# Patient Record
Sex: MALE | Race: WHITE | Employment: FULL TIME | URBAN - NONMETROPOLITAN AREA
[De-identification: names, ages, dates, MRNs, and addresses within clinical notes are randomized per-mention and may not be internally consistent; named-entity substitution may affect disease eponyms.]

---

## 2017-01-27 ENCOUNTER — OFFICE VISIT (OUTPATIENT)
Dept: BARIATRICS/WEIGHT MGMT | Age: 37
End: 2017-01-27

## 2017-01-27 VITALS
HEIGHT: 69 IN | BODY MASS INDEX: 41.59 KG/M2 | DIASTOLIC BLOOD PRESSURE: 74 MMHG | HEART RATE: 80 BPM | RESPIRATION RATE: 18 BRPM | SYSTOLIC BLOOD PRESSURE: 136 MMHG | TEMPERATURE: 98.6 F | WEIGHT: 280.8 LBS

## 2017-01-27 DIAGNOSIS — K21.9 GASTROESOPHAGEAL REFLUX DISEASE, ESOPHAGITIS PRESENCE NOT SPECIFIED: ICD-10-CM

## 2017-01-27 DIAGNOSIS — I10 HYPERTENSION, ESSENTIAL, BENIGN: ICD-10-CM

## 2017-01-27 DIAGNOSIS — N18.1 CKD (CHRONIC KIDNEY DISEASE) STAGE 1, GFR 90 ML/MIN OR GREATER: ICD-10-CM

## 2017-01-27 DIAGNOSIS — Q60.0 CONGENITAL ABSENCE OF ONE KIDNEY: ICD-10-CM

## 2017-01-27 DIAGNOSIS — E66.01 MORBID OBESITY WITH BODY MASS INDEX (BMI) OF 40.0 TO 44.9 IN ADULT (HCC): Primary | ICD-10-CM

## 2017-01-27 PROCEDURE — G8427 DOCREV CUR MEDS BY ELIG CLIN: HCPCS | Performed by: PHYSICIAN ASSISTANT

## 2017-01-27 PROCEDURE — 1036F TOBACCO NON-USER: CPT | Performed by: PHYSICIAN ASSISTANT

## 2017-01-27 PROCEDURE — G8484 FLU IMMUNIZE NO ADMIN: HCPCS | Performed by: PHYSICIAN ASSISTANT

## 2017-01-27 PROCEDURE — 99213 OFFICE O/P EST LOW 20 MIN: CPT | Performed by: PHYSICIAN ASSISTANT

## 2017-01-27 PROCEDURE — G8419 CALC BMI OUT NRM PARAM NOF/U: HCPCS | Performed by: PHYSICIAN ASSISTANT

## 2017-02-27 ENCOUNTER — OFFICE VISIT (OUTPATIENT)
Dept: BARIATRICS/WEIGHT MGMT | Age: 37
End: 2017-02-27

## 2017-02-27 VITALS
BODY MASS INDEX: 42.39 KG/M2 | SYSTOLIC BLOOD PRESSURE: 138 MMHG | DIASTOLIC BLOOD PRESSURE: 84 MMHG | RESPIRATION RATE: 18 BRPM | WEIGHT: 286.2 LBS | HEIGHT: 69 IN | HEART RATE: 88 BPM | TEMPERATURE: 98.8 F

## 2017-02-27 DIAGNOSIS — K21.9 GASTROESOPHAGEAL REFLUX DISEASE, ESOPHAGITIS PRESENCE NOT SPECIFIED: ICD-10-CM

## 2017-02-27 DIAGNOSIS — I10 ESSENTIAL HYPERTENSION: ICD-10-CM

## 2017-02-27 DIAGNOSIS — E66.01 MORBID OBESITY DUE TO EXCESS CALORIES (HCC): Primary | ICD-10-CM

## 2017-02-27 DIAGNOSIS — E66.9 OBESITY, UNSPECIFIED OBESITY SEVERITY, UNSPECIFIED OBESITY TYPE: Primary | ICD-10-CM

## 2017-02-27 DIAGNOSIS — N18.9 CHRONIC KIDNEY DISEASE, UNSPECIFIED STAGE: ICD-10-CM

## 2017-02-27 PROCEDURE — 99213 OFFICE O/P EST LOW 20 MIN: CPT | Performed by: SURGERY

## 2017-02-27 PROCEDURE — G8484 FLU IMMUNIZE NO ADMIN: HCPCS | Performed by: SURGERY

## 2017-02-27 PROCEDURE — G8427 DOCREV CUR MEDS BY ELIG CLIN: HCPCS | Performed by: SURGERY

## 2017-02-27 PROCEDURE — G8417 CALC BMI ABV UP PARAM F/U: HCPCS | Performed by: SURGERY

## 2017-02-27 PROCEDURE — 1036F TOBACCO NON-USER: CPT | Performed by: SURGERY

## 2017-02-27 ASSESSMENT — ENCOUNTER SYMPTOMS
EYES NEGATIVE: 1
APNEA: 0
ALLERGIC/IMMUNOLOGIC NEGATIVE: 1
GASTROINTESTINAL NEGATIVE: 1
SHORTNESS OF BREATH: 1

## 2017-03-08 ENCOUNTER — TELEPHONE (OUTPATIENT)
Dept: BARIATRICS/WEIGHT MGMT | Age: 37
End: 2017-03-08

## 2017-03-08 DIAGNOSIS — E55.9 VITAMIN D DEFICIENCY: Primary | ICD-10-CM

## 2017-03-08 RX ORDER — CHOLECALCIFEROL (VITAMIN D3) 1250 MCG
1 CAPSULE ORAL WEEKLY
Qty: 4 CAPSULE | Refills: 1 | Status: SHIPPED | OUTPATIENT
Start: 2017-03-08 | End: 2017-05-25

## 2017-03-10 ENCOUNTER — TELEPHONE (OUTPATIENT)
Dept: BARIATRICS/WEIGHT MGMT | Age: 37
End: 2017-03-10

## 2017-03-20 ENCOUNTER — OFFICE VISIT (OUTPATIENT)
Dept: BARIATRICS/WEIGHT MGMT | Age: 37
End: 2017-03-20

## 2017-03-20 VITALS
TEMPERATURE: 97.9 F | DIASTOLIC BLOOD PRESSURE: 72 MMHG | BODY MASS INDEX: 40.85 KG/M2 | HEART RATE: 64 BPM | SYSTOLIC BLOOD PRESSURE: 126 MMHG | RESPIRATION RATE: 18 BRPM | WEIGHT: 275.8 LBS | HEIGHT: 69 IN

## 2017-03-20 DIAGNOSIS — E66.9 OBESITY, UNSPECIFIED OBESITY SEVERITY, UNSPECIFIED OBESITY TYPE: Primary | ICD-10-CM

## 2017-03-20 DIAGNOSIS — E55.9 VITAMIN D DEFICIENCY: ICD-10-CM

## 2017-03-20 DIAGNOSIS — E66.01 MORBID OBESITY WITH BMI OF 40.0-44.9, ADULT (HCC): Primary | ICD-10-CM

## 2017-03-20 PROCEDURE — 1036F TOBACCO NON-USER: CPT | Performed by: PHYSICIAN ASSISTANT

## 2017-03-20 PROCEDURE — G8417 CALC BMI ABV UP PARAM F/U: HCPCS | Performed by: PHYSICIAN ASSISTANT

## 2017-03-20 PROCEDURE — 99213 OFFICE O/P EST LOW 20 MIN: CPT | Performed by: PHYSICIAN ASSISTANT

## 2017-03-20 PROCEDURE — G8427 DOCREV CUR MEDS BY ELIG CLIN: HCPCS | Performed by: PHYSICIAN ASSISTANT

## 2017-03-20 PROCEDURE — G8484 FLU IMMUNIZE NO ADMIN: HCPCS | Performed by: PHYSICIAN ASSISTANT

## 2017-04-03 ENCOUNTER — TELEPHONE (OUTPATIENT)
Age: 37
End: 2017-04-03

## 2017-04-03 DIAGNOSIS — E66.01 MORBID OBESITY WITH BMI OF 40.0-44.9, ADULT (HCC): Primary | ICD-10-CM

## 2017-04-24 ENCOUNTER — OFFICE VISIT (OUTPATIENT)
Dept: BARIATRICS/WEIGHT MGMT | Age: 37
End: 2017-04-24

## 2017-04-24 VITALS
WEIGHT: 272.8 LBS | RESPIRATION RATE: 16 BRPM | HEIGHT: 69 IN | TEMPERATURE: 98.2 F | BODY MASS INDEX: 40.4 KG/M2 | DIASTOLIC BLOOD PRESSURE: 78 MMHG | HEART RATE: 76 BPM | SYSTOLIC BLOOD PRESSURE: 132 MMHG

## 2017-04-24 DIAGNOSIS — E66.9 OBESITY, UNSPECIFIED OBESITY SEVERITY, UNSPECIFIED OBESITY TYPE: Primary | ICD-10-CM

## 2017-04-24 DIAGNOSIS — I10 HYPERTENSION, ESSENTIAL, BENIGN: Primary | ICD-10-CM

## 2017-04-24 DIAGNOSIS — Z01.812 PRE-PROCEDURE LAB EXAM: ICD-10-CM

## 2017-04-24 DIAGNOSIS — E66.01 OBESITY, MORBID, BMI 40.0-49.9 (HCC): ICD-10-CM

## 2017-04-24 DIAGNOSIS — K21.9 GASTROESOPHAGEAL REFLUX DISEASE, ESOPHAGITIS PRESENCE NOT SPECIFIED: ICD-10-CM

## 2017-04-24 PROCEDURE — G8427 DOCREV CUR MEDS BY ELIG CLIN: HCPCS | Performed by: SURGERY

## 2017-04-24 PROCEDURE — 1036F TOBACCO NON-USER: CPT | Performed by: SURGERY

## 2017-04-24 PROCEDURE — G8417 CALC BMI ABV UP PARAM F/U: HCPCS | Performed by: SURGERY

## 2017-04-24 PROCEDURE — 99213 OFFICE O/P EST LOW 20 MIN: CPT | Performed by: SURGERY

## 2017-04-24 ASSESSMENT — ENCOUNTER SYMPTOMS
RESPIRATORY NEGATIVE: 1
WHEEZING: 0
APNEA: 0
SORE THROAT: 0
COUGH: 0
ABDOMINAL PAIN: 0
COLOR CHANGE: 0
EYES NEGATIVE: 1
NAUSEA: 0
BLOOD IN STOOL: 0
RECTAL PAIN: 0
EYE PAIN: 0
SHORTNESS OF BREATH: 0
ABDOMINAL DISTENTION: 0
TROUBLE SWALLOWING: 0
STRIDOR: 0
CHOKING: 0
EYE DISCHARGE: 0
VOICE CHANGE: 0
PHOTOPHOBIA: 0
CONSTIPATION: 0
DIARRHEA: 0
BACK PAIN: 0
CHEST TIGHTNESS: 0
GASTROINTESTINAL NEGATIVE: 1
SINUS PRESSURE: 0
FACIAL SWELLING: 0
VOMITING: 0
EYE ITCHING: 0
EYE REDNESS: 0
RHINORRHEA: 0
ANAL BLEEDING: 0

## 2017-04-26 DIAGNOSIS — Z01.818 PRE-OP TESTING: Primary | ICD-10-CM

## 2017-04-26 DIAGNOSIS — R94.31 ABNORMAL EKG: ICD-10-CM

## 2017-05-25 ENCOUNTER — OFFICE VISIT (OUTPATIENT)
Dept: CARDIOLOGY | Age: 37
End: 2017-05-25

## 2017-05-25 VITALS
BODY MASS INDEX: 40.36 KG/M2 | HEART RATE: 68 BPM | SYSTOLIC BLOOD PRESSURE: 130 MMHG | HEIGHT: 69 IN | WEIGHT: 272.5 LBS | DIASTOLIC BLOOD PRESSURE: 70 MMHG

## 2017-05-25 DIAGNOSIS — E66.9 OBESITY, UNSPECIFIED OBESITY SEVERITY, UNSPECIFIED OBESITY TYPE: ICD-10-CM

## 2017-05-25 DIAGNOSIS — Z01.818 PRE-OPERATIVE CLEARANCE: Primary | ICD-10-CM

## 2017-05-25 DIAGNOSIS — I10 ESSENTIAL HYPERTENSION: ICD-10-CM

## 2017-05-25 DIAGNOSIS — Z87.891 EX-SMOKER: ICD-10-CM

## 2017-05-25 DIAGNOSIS — Z91.89 CARDIOVASCULAR RISK FACTOR: ICD-10-CM

## 2017-05-25 PROCEDURE — G8417 CALC BMI ABV UP PARAM F/U: HCPCS | Performed by: INTERNAL MEDICINE

## 2017-05-25 PROCEDURE — 99213 OFFICE O/P EST LOW 20 MIN: CPT | Performed by: INTERNAL MEDICINE

## 2017-05-25 PROCEDURE — 1036F TOBACCO NON-USER: CPT | Performed by: INTERNAL MEDICINE

## 2017-05-25 PROCEDURE — G8427 DOCREV CUR MEDS BY ELIG CLIN: HCPCS | Performed by: INTERNAL MEDICINE

## 2017-06-08 ENCOUNTER — TELEPHONE (OUTPATIENT)
Dept: BARIATRICS/WEIGHT MGMT | Age: 37
End: 2017-06-08

## 2017-06-12 ENCOUNTER — OFFICE VISIT (OUTPATIENT)
Dept: BARIATRICS/WEIGHT MGMT | Age: 37
End: 2017-06-12

## 2017-06-12 DIAGNOSIS — E66.9 OBESITY, UNSPECIFIED OBESITY SEVERITY, UNSPECIFIED OBESITY TYPE: Primary | ICD-10-CM

## 2017-06-23 ENCOUNTER — OFFICE VISIT (OUTPATIENT)
Dept: BARIATRICS/WEIGHT MGMT | Age: 37
End: 2017-06-23

## 2017-06-23 VITALS
DIASTOLIC BLOOD PRESSURE: 68 MMHG | WEIGHT: 260.6 LBS | HEART RATE: 80 BPM | TEMPERATURE: 98.6 F | HEIGHT: 69 IN | BODY MASS INDEX: 38.6 KG/M2 | SYSTOLIC BLOOD PRESSURE: 130 MMHG | RESPIRATION RATE: 18 BRPM

## 2017-06-23 DIAGNOSIS — K76.0 FATTY LIVER DISEASE, NONALCOHOLIC: ICD-10-CM

## 2017-06-23 DIAGNOSIS — E66.9 OBESITY, CLASS II, BMI 35-39.9: Primary | ICD-10-CM

## 2017-06-23 DIAGNOSIS — K21.9 GASTROESOPHAGEAL REFLUX DISEASE, ESOPHAGITIS PRESENCE NOT SPECIFIED: ICD-10-CM

## 2017-06-23 DIAGNOSIS — I10 HYPERTENSION, ESSENTIAL, BENIGN: ICD-10-CM

## 2017-06-23 PROCEDURE — 99213 OFFICE O/P EST LOW 20 MIN: CPT | Performed by: SURGERY

## 2017-06-23 PROCEDURE — G8417 CALC BMI ABV UP PARAM F/U: HCPCS | Performed by: SURGERY

## 2017-06-23 PROCEDURE — 1036F TOBACCO NON-USER: CPT | Performed by: SURGERY

## 2017-06-23 PROCEDURE — G8427 DOCREV CUR MEDS BY ELIG CLIN: HCPCS | Performed by: SURGERY

## 2017-06-23 ASSESSMENT — ENCOUNTER SYMPTOMS
EYES NEGATIVE: 1
GASTROINTESTINAL NEGATIVE: 1
RESPIRATORY NEGATIVE: 1

## 2017-06-24 RX ORDER — METOCLOPRAMIDE 10 MG/1
10 TABLET ORAL EVERY 6 HOURS PRN
Qty: 30 TABLET | Refills: 1 | Status: SHIPPED | OUTPATIENT
Start: 2017-06-26 | End: 2017-07-10

## 2017-06-24 RX ORDER — ONDANSETRON 4 MG/1
4 TABLET, FILM COATED ORAL EVERY 4 HOURS PRN
Qty: 30 TABLET | Refills: 1 | Status: SHIPPED | OUTPATIENT
Start: 2017-06-26 | End: 2017-07-10

## 2017-06-28 DIAGNOSIS — E66.9 OBESITY, CLASS II, BMI 35-39.9: ICD-10-CM

## 2017-06-28 DIAGNOSIS — Z98.84 S/P LAPAROSCOPIC SLEEVE GASTRECTOMY: Primary | ICD-10-CM

## 2017-06-28 DIAGNOSIS — K21.9 GASTROESOPHAGEAL REFLUX DISEASE, ESOPHAGITIS PRESENCE NOT SPECIFIED: ICD-10-CM

## 2017-06-28 RX ORDER — OMEPRAZOLE 40 MG/1
40 CAPSULE, DELAYED RELEASE ORAL DAILY
Qty: 30 CAPSULE | Refills: 2 | Status: SHIPPED | OUTPATIENT
Start: 2017-06-28 | End: 2018-02-12 | Stop reason: SDUPTHER

## 2017-07-03 ENCOUNTER — OFFICE VISIT (OUTPATIENT)
Dept: BARIATRICS/WEIGHT MGMT | Age: 37
End: 2017-07-03

## 2017-07-03 VITALS
RESPIRATION RATE: 18 BRPM | HEIGHT: 69 IN | WEIGHT: 251.2 LBS | BODY MASS INDEX: 37.2 KG/M2 | HEART RATE: 59 BPM | SYSTOLIC BLOOD PRESSURE: 123 MMHG | DIASTOLIC BLOOD PRESSURE: 74 MMHG | TEMPERATURE: 99.3 F

## 2017-07-03 DIAGNOSIS — E66.9 OBESITY (BMI 30-39.9): Primary | ICD-10-CM

## 2017-07-03 DIAGNOSIS — Z98.84 S/P LAPAROSCOPIC SLEEVE GASTRECTOMY: ICD-10-CM

## 2017-07-03 PROCEDURE — 99024 POSTOP FOLLOW-UP VISIT: CPT | Performed by: SURGERY

## 2017-07-03 RX ORDER — OXYCODONE HYDROCHLORIDE 5 MG/1
5-10 TABLET ORAL EVERY 4 HOURS PRN
Qty: 30 TABLET | Refills: 0 | Status: SHIPPED | OUTPATIENT
Start: 2017-07-03 | End: 2017-07-10

## 2017-07-03 RX ORDER — HYOSCYAMINE SULFATE 0.125 MG
125 TABLET,DISINTEGRATING ORAL EVERY 6 HOURS PRN
Qty: 30 TABLET | Refills: 0 | Status: SHIPPED | OUTPATIENT
Start: 2017-07-03 | End: 2017-07-10

## 2017-07-03 ASSESSMENT — ENCOUNTER SYMPTOMS
RESPIRATORY NEGATIVE: 1
EYES NEGATIVE: 1
ABDOMINAL DISTENTION: 0
ABDOMINAL PAIN: 1
NAUSEA: 0
BACK PAIN: 1
VOMITING: 0
CONSTIPATION: 1
BLOOD IN STOOL: 0
DIARRHEA: 0
RECTAL PAIN: 0

## 2017-07-10 ENCOUNTER — OFFICE VISIT (OUTPATIENT)
Dept: BARIATRICS/WEIGHT MGMT | Age: 37
End: 2017-07-10

## 2017-07-10 VITALS
HEIGHT: 69 IN | BODY MASS INDEX: 36.26 KG/M2 | SYSTOLIC BLOOD PRESSURE: 118 MMHG | DIASTOLIC BLOOD PRESSURE: 76 MMHG | TEMPERATURE: 98.5 F | RESPIRATION RATE: 18 BRPM | HEART RATE: 64 BPM | WEIGHT: 244.8 LBS

## 2017-07-10 DIAGNOSIS — E66.9 OBESITY (BMI 30-39.9): Primary | ICD-10-CM

## 2017-07-10 DIAGNOSIS — E66.9 OBESITY, CLASS II, BMI 35-39.9: ICD-10-CM

## 2017-07-10 DIAGNOSIS — Z13.21 SCREENING FOR MALNUTRITION: ICD-10-CM

## 2017-07-10 DIAGNOSIS — K91.2 POSTSURGICAL MALABSORPTION: ICD-10-CM

## 2017-07-10 DIAGNOSIS — Z98.84 STATUS POST LAPAROSCOPIC SLEEVE GASTRECTOMY: ICD-10-CM

## 2017-07-10 PROCEDURE — 99024 POSTOP FOLLOW-UP VISIT: CPT | Performed by: PHYSICIAN ASSISTANT

## 2017-07-10 RX ORDER — THIAMINE HCL 100 MG
2500 TABLET ORAL DAILY
COMMUNITY
End: 2017-09-18

## 2017-08-09 ENCOUNTER — OFFICE VISIT (OUTPATIENT)
Dept: BARIATRICS/WEIGHT MGMT | Age: 37
End: 2017-08-09

## 2017-08-09 VITALS
DIASTOLIC BLOOD PRESSURE: 70 MMHG | SYSTOLIC BLOOD PRESSURE: 108 MMHG | HEART RATE: 56 BPM | HEIGHT: 69 IN | BODY MASS INDEX: 34 KG/M2 | WEIGHT: 229.56 LBS | TEMPERATURE: 97.9 F

## 2017-08-09 DIAGNOSIS — Z13.21 MALNUTRITION SCREEN: ICD-10-CM

## 2017-08-09 DIAGNOSIS — E66.9 OBESITY, CLASS II, BMI 35-39.9: Primary | ICD-10-CM

## 2017-08-09 DIAGNOSIS — K91.2 POSTOPERATIVE INTESTINAL MALABSORPTION: ICD-10-CM

## 2017-08-09 DIAGNOSIS — Z98.84 STATUS POST LAPAROSCOPIC SLEEVE GASTRECTOMY: ICD-10-CM

## 2017-08-09 DIAGNOSIS — E66.01 MORBID OBESITY, UNSPECIFIED OBESITY TYPE (HCC): Primary | ICD-10-CM

## 2017-08-09 PROCEDURE — 99024 POSTOP FOLLOW-UP VISIT: CPT | Performed by: PHYSICIAN ASSISTANT

## 2017-09-13 ENCOUNTER — HOSPITAL ENCOUNTER (OUTPATIENT)
Age: 37
Discharge: HOME | End: 2017-09-13
Payer: COMMERCIAL

## 2017-09-13 DIAGNOSIS — E66.9 OBESITY, CLASS II, BMI 35-39.9: ICD-10-CM

## 2017-09-13 DIAGNOSIS — K91.2 POSTOPERATIVE INTESTINAL MALABSORPTION: ICD-10-CM

## 2017-09-13 DIAGNOSIS — Z98.84 STATUS POST LAPAROSCOPIC SLEEVE GASTRECTOMY: ICD-10-CM

## 2017-09-13 DIAGNOSIS — Z13.21 MALNUTRITION SCREEN: ICD-10-CM

## 2017-09-13 LAB
ALBUMIN SERPL-MCNC: 4.1 G/DL (ref 3.5–5.1)
ALP BLD-CCNC: 68 U/L (ref 38–126)
ALT SERPL-CCNC: 29 U/L (ref 11–66)
ANION GAP SERPL CALCULATED.3IONS-SCNC: 12 MEQ/L (ref 8–16)
AST SERPL-CCNC: 33 U/L (ref 5–40)
BASOPHILS # BLD: 0.7 %
BASOPHILS ABSOLUTE: 0 THOU/MM3 (ref 0–0.1)
BILIRUB SERPL-MCNC: 0.7 MG/DL (ref 0.3–1.2)
BUN BLDV-MCNC: 24 MG/DL (ref 7–22)
CALCIUM SERPL-MCNC: 9.9 MG/DL (ref 8.5–10.5)
CHLORIDE BLD-SCNC: 102 MEQ/L (ref 98–111)
CO2: 29 MEQ/L (ref 23–33)
CREAT SERPL-MCNC: 0.8 MG/DL (ref 0.4–1.2)
EOSINOPHIL # BLD: 1.8 %
EOSINOPHILS ABSOLUTE: 0.1 THOU/MM3 (ref 0–0.4)
GFR SERPL CREATININE-BSD FRML MDRD: > 90 ML/MIN/1.73M2
GLUCOSE BLD-MCNC: 79 MG/DL (ref 70–108)
HCT VFR BLD CALC: 42.2 % (ref 42–52)
HEMOGLOBIN: 14.3 GM/DL (ref 14–18)
LYMPHOCYTES # BLD: 36.6 %
LYMPHOCYTES ABSOLUTE: 2.2 THOU/MM3 (ref 1–4.8)
MCH RBC QN AUTO: 31.3 PG (ref 27–31)
MCHC RBC AUTO-ENTMCNC: 33.8 GM/DL (ref 33–37)
MCV RBC AUTO: 92.5 FL (ref 80–94)
MONOCYTES # BLD: 6.6 %
MONOCYTES ABSOLUTE: 0.4 THOU/MM3 (ref 0.4–1.3)
NUCLEATED RED BLOOD CELLS: 0 /100 WBC
PDW BLD-RTO: 13.2 % (ref 11.5–14.5)
PLATELET # BLD: 187 THOU/MM3 (ref 130–400)
PMV BLD AUTO: 9.9 MCM (ref 7.4–10.4)
POTASSIUM SERPL-SCNC: 4.5 MEQ/L (ref 3.5–5.2)
PREALBUMIN: 15.7 MG/DL (ref 20–40)
RBC # BLD: 4.56 MILL/MM3 (ref 4.7–6.1)
RBC # BLD: NORMAL 10*6/UL
SEG NEUTROPHILS: 54.3 %
SEGMENTED NEUTROPHILS ABSOLUTE COUNT: 3.2 THOU/MM3 (ref 1.8–7.7)
SODIUM BLD-SCNC: 143 MEQ/L (ref 135–145)
TOTAL PROTEIN: 7.2 G/DL (ref 6.1–8)
VITAMIN D 25-HYDROXY: 54 NG/ML (ref 30–100)
WBC # BLD: 5.9 THOU/MM3 (ref 4.8–10.8)

## 2017-09-13 PROCEDURE — 85025 COMPLETE CBC W/AUTO DIFF WBC: CPT

## 2017-09-13 PROCEDURE — 84134 ASSAY OF PREALBUMIN: CPT

## 2017-09-13 PROCEDURE — 82306 VITAMIN D 25 HYDROXY: CPT

## 2017-09-13 PROCEDURE — 80053 COMPREHEN METABOLIC PANEL: CPT

## 2017-09-13 PROCEDURE — 36415 COLL VENOUS BLD VENIPUNCTURE: CPT

## 2017-09-13 PROCEDURE — 84425 ASSAY OF VITAMIN B-1: CPT

## 2017-09-17 LAB — VITAMIN B1 WHOLE BLOOD: 168 NMOL/L (ref 70–180)

## 2017-09-18 ENCOUNTER — OFFICE VISIT (OUTPATIENT)
Dept: BARIATRICS/WEIGHT MGMT | Age: 37
End: 2017-09-18

## 2017-09-18 VITALS
WEIGHT: 210.4 LBS | DIASTOLIC BLOOD PRESSURE: 75 MMHG | TEMPERATURE: 98.6 F | BODY MASS INDEX: 31.16 KG/M2 | HEIGHT: 69 IN | HEART RATE: 52 BPM | RESPIRATION RATE: 16 BRPM | SYSTOLIC BLOOD PRESSURE: 120 MMHG

## 2017-09-18 DIAGNOSIS — K91.2 POSTOPERATIVE INTESTINAL MALABSORPTION: ICD-10-CM

## 2017-09-18 DIAGNOSIS — E66.01 MORBID OBESITY, UNSPECIFIED OBESITY TYPE (HCC): Primary | ICD-10-CM

## 2017-09-18 DIAGNOSIS — Z98.84 S/P LAPAROSCOPIC SLEEVE GASTRECTOMY: ICD-10-CM

## 2017-09-18 DIAGNOSIS — E66.9 OBESITY (BMI 30-39.9): Primary | ICD-10-CM

## 2017-09-18 DIAGNOSIS — Z13.21 MALNUTRITION SCREEN: ICD-10-CM

## 2017-09-18 PROCEDURE — 99024 POSTOP FOLLOW-UP VISIT: CPT | Performed by: PHYSICIAN ASSISTANT

## 2017-10-19 ENCOUNTER — HOSPITAL ENCOUNTER (OUTPATIENT)
Age: 37
Discharge: HOME | End: 2017-10-19
Payer: COMMERCIAL

## 2017-10-19 LAB
ANION GAP SERPL CALCULATED.3IONS-SCNC: 14 MEQ/L (ref 8–16)
BUN BLDV-MCNC: 26 MG/DL (ref 7–22)
CALCIUM SERPL-MCNC: 9.9 MG/DL (ref 8.5–10.5)
CHLORIDE BLD-SCNC: 100 MEQ/L (ref 98–111)
CO2: 29 MEQ/L (ref 23–33)
CREAT SERPL-MCNC: 0.7 MG/DL (ref 0.4–1.2)
GFR SERPL CREATININE-BSD FRML MDRD: > 90 ML/MIN/1.73M2
GLUCOSE BLD-MCNC: 80 MG/DL (ref 70–108)
POTASSIUM SERPL-SCNC: 5 MEQ/L (ref 3.5–5.2)
SODIUM BLD-SCNC: 143 MEQ/L (ref 135–145)

## 2017-10-19 PROCEDURE — 36415 COLL VENOUS BLD VENIPUNCTURE: CPT

## 2017-10-19 PROCEDURE — 80048 BASIC METABOLIC PNL TOTAL CA: CPT

## 2017-11-14 ENCOUNTER — OFFICE VISIT (OUTPATIENT)
Dept: NEPHROLOGY | Age: 37
End: 2017-11-14
Payer: COMMERCIAL

## 2017-11-14 VITALS
HEART RATE: 51 BPM | SYSTOLIC BLOOD PRESSURE: 124 MMHG | BODY MASS INDEX: 29.24 KG/M2 | DIASTOLIC BLOOD PRESSURE: 84 MMHG | OXYGEN SATURATION: 99 % | WEIGHT: 198 LBS

## 2017-11-14 DIAGNOSIS — Q60.0 UNILATERAL AGENESIS OF KIDNEY: Primary | ICD-10-CM

## 2017-11-14 DIAGNOSIS — I10 HYPERTENSION, ESSENTIAL, BENIGN: ICD-10-CM

## 2017-11-14 PROCEDURE — G8428 CUR MEDS NOT DOCUMENT: HCPCS | Performed by: INTERNAL MEDICINE

## 2017-11-14 PROCEDURE — G8417 CALC BMI ABV UP PARAM F/U: HCPCS | Performed by: INTERNAL MEDICINE

## 2017-11-14 PROCEDURE — 99213 OFFICE O/P EST LOW 20 MIN: CPT | Performed by: INTERNAL MEDICINE

## 2017-11-14 PROCEDURE — G8484 FLU IMMUNIZE NO ADMIN: HCPCS | Performed by: INTERNAL MEDICINE

## 2017-11-14 PROCEDURE — 1036F TOBACCO NON-USER: CPT | Performed by: INTERNAL MEDICINE

## 2017-11-14 NOTE — PROGRESS NOTES
[facility]          Outpatient Follow-Up note         11/14/2017 3:46 PM    Patient Name:   Silvino Brandon  YOB: 1980  Primary Care Physician:  Ramses Meade CNP     Chief Complaint / Reason for follow-up : Follow Up of  Solitary kidney and hypertension     Interval History :  Patient seen and examined by me. No distress  No chest pain or shortness of breath. Since I have last seen him patient has undergone a gastric sleeve surgery and has lost 90 pounds. He is currently not on any medications      Past History :  Past Medical History:   Diagnosis Date    Abnormal liver function tests     Congenital absence of one kidney     GERD (gastroesophageal reflux disease)     Hypertension     Hypertension, essential, benign      Past Surgical History:   Procedure Laterality Date    CARDIAC CATHETERIZATION  07/06/2016    CHOLECYSTECTOMY  07/11/2016    HIXENBAUGH    CYST REMOVAL Right 6 months old    testical-non cancerous    HERNIA REPAIR  6 months old    inguinal    LIVER BIOPSY  2012    WISDOM TOOTH EXTRACTION          Medications :     Outpatient Prescriptions Marked as Taking for the 11/14/17 encounter (Office Visit) with Jesus Jacob MD   Medication Sig Dispense Refill    omeprazole (PRILOSEC) 40 MG delayed release capsule Take 1 capsule by mouth daily Open capsule and take in liquid (Patient taking differently: Take 40 mg by mouth as needed Open capsule and take in liquid) 30 capsule 2    Calcium Citrate 250 MG TABS Take 500 mg by mouth 2 times daily       ketoconazole (NIZORAL) 2 % shampoo Apply topically daily as needed for Itching Apply topically daily as needed.  Multiple Vitamin (MVI, CELEBRATE, CHEWABLE TABLET) Take 3 tablets by mouth daily Takes 2 pills in the am and 1 pill in the pm.  This Multivitamin has extra iron in it.       LUCRECIA LO 40 40 % cream Apply topically nightly as needed       cetirizine (ZYRTEC ALLERGY) 10 MG tablet Take 1

## 2017-12-23 ENCOUNTER — HOSPITAL ENCOUNTER (OUTPATIENT)
Age: 37
Discharge: HOME | End: 2017-12-23
Payer: COMMERCIAL

## 2017-12-23 DIAGNOSIS — E66.9 OBESITY (BMI 30-39.9): ICD-10-CM

## 2017-12-23 DIAGNOSIS — K91.2 POSTOPERATIVE INTESTINAL MALABSORPTION: ICD-10-CM

## 2017-12-23 DIAGNOSIS — Z98.84 S/P LAPAROSCOPIC SLEEVE GASTRECTOMY: ICD-10-CM

## 2017-12-23 DIAGNOSIS — Z13.21 MALNUTRITION SCREEN: ICD-10-CM

## 2017-12-23 LAB
ALBUMIN SERPL-MCNC: 4.1 G/DL (ref 3.5–5.1)
ALP BLD-CCNC: 73 U/L (ref 38–126)
ALT SERPL-CCNC: 21 U/L (ref 11–66)
ANION GAP SERPL CALCULATED.3IONS-SCNC: 10 MEQ/L (ref 8–16)
AST SERPL-CCNC: 25 U/L (ref 5–40)
AVERAGE GLUCOSE: 87 MG/DL (ref 70–126)
BASOPHILS # BLD: 0.6 %
BASOPHILS ABSOLUTE: 0 THOU/MM3 (ref 0–0.1)
BILIRUB SERPL-MCNC: 0.9 MG/DL (ref 0.3–1.2)
BUN BLDV-MCNC: 23 MG/DL (ref 7–22)
CALCIUM SERPL-MCNC: 9.4 MG/DL (ref 8.5–10.5)
CHLORIDE BLD-SCNC: 103 MEQ/L (ref 98–111)
CO2: 30 MEQ/L (ref 23–33)
CREAT SERPL-MCNC: 0.7 MG/DL (ref 0.4–1.2)
EOSINOPHIL # BLD: 1.8 %
EOSINOPHILS ABSOLUTE: 0.1 THOU/MM3 (ref 0–0.4)
FERRITIN: 193 NG/ML (ref 22–322)
GFR SERPL CREATININE-BSD FRML MDRD: > 90 ML/MIN/1.73M2
GLUCOSE BLD-MCNC: 90 MG/DL (ref 70–108)
HBA1C MFR BLD: 4.9 % (ref 4.4–6.4)
HCT VFR BLD CALC: 42.9 % (ref 42–52)
HEMOGLOBIN: 14.5 GM/DL (ref 14–18)
IRON: 133 UG/DL (ref 65–195)
LYMPHOCYTES # BLD: 38.6 %
LYMPHOCYTES ABSOLUTE: 1.9 THOU/MM3 (ref 1–4.8)
MCH RBC QN AUTO: 31.7 PG (ref 27–31)
MCHC RBC AUTO-ENTMCNC: 33.9 GM/DL (ref 33–37)
MCV RBC AUTO: 93.7 FL (ref 80–94)
MONOCYTES # BLD: 6.9 %
MONOCYTES ABSOLUTE: 0.3 THOU/MM3 (ref 0.4–1.3)
NUCLEATED RED BLOOD CELLS: 0 /100 WBC
PDW BLD-RTO: 13.7 % (ref 11.5–14.5)
PLATELET # BLD: 196 THOU/MM3 (ref 130–400)
PMV BLD AUTO: 9.2 MCM (ref 7.4–10.4)
POTASSIUM SERPL-SCNC: 4.5 MEQ/L (ref 3.5–5.2)
PREALBUMIN: 16.8 MG/DL (ref 20–40)
PTH INTACT: 27.6 PG/ML (ref 15–65)
RBC # BLD: 4.58 MILL/MM3 (ref 4.7–6.1)
SEG NEUTROPHILS: 52.1 %
SEGMENTED NEUTROPHILS ABSOLUTE COUNT: 2.6 THOU/MM3 (ref 1.8–7.7)
SODIUM BLD-SCNC: 143 MEQ/L (ref 135–145)
TOTAL IRON BINDING CAPACITY: 243 UG/DL (ref 171–450)
TOTAL PROTEIN: 7.1 G/DL (ref 6.1–8)
TSH SERPL DL<=0.05 MIU/L-ACNC: 1.23 UIU/ML (ref 0.4–4.2)
VITAMIN D 25-HYDROXY: 51 NG/ML (ref 30–100)
WBC # BLD: 5 THOU/MM3 (ref 4.8–10.8)

## 2017-12-23 PROCEDURE — 83550 IRON BINDING TEST: CPT

## 2017-12-23 PROCEDURE — 83970 ASSAY OF PARATHORMONE: CPT

## 2017-12-23 PROCEDURE — 83540 ASSAY OF IRON: CPT

## 2017-12-23 PROCEDURE — 84134 ASSAY OF PREALBUMIN: CPT

## 2017-12-23 PROCEDURE — 36415 COLL VENOUS BLD VENIPUNCTURE: CPT

## 2017-12-23 PROCEDURE — 82306 VITAMIN D 25 HYDROXY: CPT

## 2017-12-23 PROCEDURE — 83036 HEMOGLOBIN GLYCOSYLATED A1C: CPT

## 2017-12-23 PROCEDURE — 80050 GENERAL HEALTH PANEL: CPT

## 2017-12-23 PROCEDURE — 84425 ASSAY OF VITAMIN B-1: CPT

## 2017-12-23 PROCEDURE — 82728 ASSAY OF FERRITIN: CPT

## 2017-12-28 LAB — VITAMIN B1 WHOLE BLOOD: 168 NMOL/L (ref 70–180)

## 2018-01-08 ENCOUNTER — OFFICE VISIT (OUTPATIENT)
Dept: BARIATRICS/WEIGHT MGMT | Age: 38
End: 2018-01-08
Payer: COMMERCIAL

## 2018-01-08 VITALS
BODY MASS INDEX: 27.85 KG/M2 | WEIGHT: 188 LBS | TEMPERATURE: 98.6 F | HEART RATE: 61 BPM | HEIGHT: 69 IN | SYSTOLIC BLOOD PRESSURE: 129 MMHG | DIASTOLIC BLOOD PRESSURE: 67 MMHG

## 2018-01-08 DIAGNOSIS — K21.9 GASTROESOPHAGEAL REFLUX DISEASE, ESOPHAGITIS PRESENCE NOT SPECIFIED: ICD-10-CM

## 2018-01-08 DIAGNOSIS — Z98.84 S/P LAPAROSCOPIC SLEEVE GASTRECTOMY: ICD-10-CM

## 2018-01-08 DIAGNOSIS — E66.3 OVERWEIGHT (BMI 25.0-29.9): Primary | ICD-10-CM

## 2018-01-08 PROCEDURE — G8417 CALC BMI ABV UP PARAM F/U: HCPCS | Performed by: PHYSICIAN ASSISTANT

## 2018-01-08 PROCEDURE — G8427 DOCREV CUR MEDS BY ELIG CLIN: HCPCS | Performed by: PHYSICIAN ASSISTANT

## 2018-01-08 PROCEDURE — 1036F TOBACCO NON-USER: CPT | Performed by: PHYSICIAN ASSISTANT

## 2018-01-08 PROCEDURE — 99213 OFFICE O/P EST LOW 20 MIN: CPT | Performed by: PHYSICIAN ASSISTANT

## 2018-01-08 PROCEDURE — G8484 FLU IMMUNIZE NO ADMIN: HCPCS | Performed by: PHYSICIAN ASSISTANT

## 2018-01-08 NOTE — PROGRESS NOTES
[facility] PROFESSIONAL SERVS  [facility]  [facility] [address] , [address]  [place] [address]  Dept: [phone]  Loc: [phone]      Visit Date:  1/8/2018  Weight Management Postop Follow-up    HPI:      Lesley Sweet is a 41 y.o. male who is here today for 6 months follow up since  robotic-assisted sleeve gastrectomy performed by Dr. Mine Murray  on 6/26/17. Doing great. Feeling great. Down 28# since last visit. Down 78# since surgery. BMI 27 . Has a personal goal of reaching 175#. Drinking over 64 oz of fluid and 80-90 grams of protein daily. Continues to drink 1-2 protein shakes daily. Tolerating post-op diet. No problems with bowel movements. No nausea. No emesis. No GERD/ Reflux-taking PPI every other day. Denies CP/SOB. No Dizziness. No abdominal pain. No incisional discomfort. No sx of dehydration. Taking and tolerating all vitamins. SECA scale and measurements completed and reviewed with patient today. Plan to focus on core strengthening. 6 month labs reviewed- prealbumin slightly low- reports fasting 16+ hours prior to lab draw. Eating/drinking at least 80 grams of protein daily. Physical Activity: Gym-treadmill/machines for 60 minutes  Days per week: 4-5    Current BMI: Body mass index is 27.76 kg/m².   Current Weight:   Wt Readings from Last 3 Encounters:   01/08/18 188 lb (85.3 kg)   11/14/17 198 lb (89.8 kg)   09/18/17 210 lb 6.4 oz (95.4 kg)     Pre-op Body Weight: 260  Initial: 278    Past Medical History:  Past Medical History:   Diagnosis Date    Abnormal liver function tests     Congenital absence of one kidney     GERD (gastroesophageal reflux disease)     Hypertension     Hypertension, essential, benign        Past Surgical History:  Past Surgical History:   Procedure Laterality Date    CARDIAC CATHETERIZATION  07/06/2016    CHOLECYST[facility]OMY  07/11/2016    [facility]    CYST REMOVAL Right 6 months old    testical-non cancerous    HERNIA REPAIR  6 months old    inguinal discomfort. (+) intermittent reflux  : Denies any hematuria, hesitancy or dysuria. NEURO: Denies seizures, headache. Objective:    /67 (Site: Right Arm, Position: Sitting, Cuff Size: Large Adult)   Pulse 61   Temp 98.6 °F (37 °C) (Oral)   Ht 5' 9\" (1.753 m)   Wt 188 lb (85.3 kg)   BMI 27.76 kg/m²     Physical Examination:   Constitutional: Alert and oriented to person, place and time. Well-developed, well- nourished. Head: Normocephalic and atraumatic  Neck: Supple. Eyes: EOMI b/l. Conjunctivae normal.  No scleral icterus. Respiratory: Effort normal. No respiratory distress. Abd: Benign  Ext:  Movement x 4. No edema  Skin; Warm and dry, no visible rashes, lesions or ulcers.    Neuro: Cranial Nerves Grossly Intact; nml coordination      Labs:  CBC   Lab Results   Component Value Date    WBC 5.0 12/23/2017    RBC 4.58 12/23/2017    HGB 14.5 12/23/2017    HCT 42.9 12/23/2017    MCV 93.7 12/23/2017    MCH 31.7 12/23/2017    MCHC 33.9 12/23/2017    RDW 13.7 12/23/2017     12/23/2017    MPV 9.2 12/23/2017    RBCMORP NORMAL 09/13/2017    SEGSPCT 52.1 12/23/2017    LABLYMP 38.6 12/23/2017    MONOPCT 6.9 12/23/2017    LABEOS 1.8 12/23/2017    BASO 0.6 12/23/2017    NRBC 0 12/23/2017    SEGSABS 2.6 12/23/2017    LYMPHSABS 1.9 12/23/2017    MONOSABS 0.3 12/23/2017    EOSABS 0.1 12/23/2017    BASOSABS 0.0 12/23/2017        BMP/CMP   Lab Results   Component Value Date    GLUCOSE 90 12/23/2017    CREATININE 0.7 12/23/2017    BUN 23 12/23/2017     12/23/2017    K 4.5 12/23/2017     12/23/2017    CO2 30 12/23/2017    CALCIUM 9.4 12/23/2017    AST 25 12/23/2017    ALKPHOS 73 12/23/2017    PROT 7.1 12/23/2017    LABALBU 4.1 12/23/2017    BILITOT 0.9 12/23/2017    ALT 21 12/23/2017        PREALBUMIN   Lab Results   Component Value Date    PREALBUMIN 16.8 12/23/2017        VITAMIN B12   Lab Results   Component Value Date    PEUPQUEC06 267 03/07/2017        24 HOUR URINE CALCIUM   No results found for: Devon Anshuconstanza NIKOUR     VITAMIN D   Lab Results   Component Value Date    XRTS78 67 12/23/2017        VITAMIN B1/ THIAMINE   Lab Results   Component Value Date    [id number] 168 12/23/2017        RBC FOLATE   Lab Results   Component Value Date    FOLATE 14.3 03/07/2017        LIPID SCREEN (FASTING)   Lab Results   Component Value Date    CHOL 157 03/07/2017    TRIG 65 03/07/2017    HDL 55 03/07/2017    LDLCALC 89 03/07/2017   ,     HGA1C (T2DM ONLY)   Lab Results   Component Value Date    LABA1C 4.9 12/23/2017    AVGG 87 12/23/2017        TSH   Lab Results   Component Value Date    TSH 1.230 12/23/2017        IRON   Lab Results   Component Value Date    IRON 133 12/23/2017        IONIZED CALCIUM   No results found for: LORE MCKEON      Assessment:      1. Overweight (BMI 25.0-29.9)     2. S/P laparoscopic sleeve gastrectomy     3. Gastroesophageal reflux disease, esophagitis presence not specified       Plan:    Stay well hydrated. Drink a minimum of 64 oz of non-carbonated, non-caffeinated fluids daily. Nutritional education occurred during visit. Tolerating diet. Continue following with dietitian and follow their recommendations as directed. Continue  At least 60 grams of protein each day. Signs and symptoms reviewed with patient that would be concerning and need her to return to office for re-evaluation. Patient will call if any questions or concerns arrise. Continue physical activity and strength training- focus on core strengthening  Continue taking Multivitamin, Calcium and B12 as directed  Encouraged to attend support groups  6 month labs reviewed with patient today  SECA scale completed and reviewed with patient  Measurements completed and reviewed. Return in about 3 months (around 4/8/2018) for postop follow up. I spent 15 minutes in direct patient care with greater than 50% spent in counseling and coordination of care.      Electronically signed by JOJO Herrera on 1/8/2018 at 3:52 PM

## 2018-02-06 ENCOUNTER — TELEPHONE (OUTPATIENT)
Dept: BARIATRICS/WEIGHT MGMT | Age: 38
End: 2018-02-06

## 2018-02-12 DIAGNOSIS — Z98.84 S/P LAPAROSCOPIC SLEEVE GASTRECTOMY: ICD-10-CM

## 2018-02-12 DIAGNOSIS — E66.9 OBESITY, CLASS II, BMI 35-39.9: ICD-10-CM

## 2018-02-12 DIAGNOSIS — K21.9 GASTROESOPHAGEAL REFLUX DISEASE, ESOPHAGITIS PRESENCE NOT SPECIFIED: ICD-10-CM

## 2018-02-12 RX ORDER — OMEPRAZOLE 40 MG/1
40 CAPSULE, DELAYED RELEASE ORAL DAILY
Qty: 30 CAPSULE | Refills: 2 | Status: SHIPPED | OUTPATIENT
Start: 2018-02-12 | End: 2018-06-18 | Stop reason: SDUPTHER

## 2018-02-15 ENCOUNTER — TELEPHONE (OUTPATIENT)
Dept: CARDIOLOGY CLINIC | Age: 38
End: 2018-02-15

## 2018-04-09 ENCOUNTER — OFFICE VISIT (OUTPATIENT)
Dept: BARIATRICS/WEIGHT MGMT | Age: 38
End: 2018-04-09
Payer: COMMERCIAL

## 2018-04-09 VITALS
BODY MASS INDEX: 27.13 KG/M2 | HEART RATE: 65 BPM | HEIGHT: 69 IN | SYSTOLIC BLOOD PRESSURE: 112 MMHG | RESPIRATION RATE: 16 BRPM | DIASTOLIC BLOOD PRESSURE: 76 MMHG | TEMPERATURE: 98.5 F | WEIGHT: 183.2 LBS

## 2018-04-09 DIAGNOSIS — K91.2 POSTOPERATIVE INTESTINAL MALABSORPTION: ICD-10-CM

## 2018-04-09 DIAGNOSIS — K21.9 GASTROESOPHAGEAL REFLUX DISEASE, ESOPHAGITIS PRESENCE NOT SPECIFIED: ICD-10-CM

## 2018-04-09 DIAGNOSIS — Z98.84 S/P LAPAROSCOPIC SLEEVE GASTRECTOMY: Primary | ICD-10-CM

## 2018-04-09 DIAGNOSIS — Z13.21 MALNUTRITION SCREEN: ICD-10-CM

## 2018-04-09 DIAGNOSIS — E66.3 OVERWEIGHT (BMI 25.0-29.9): ICD-10-CM

## 2018-04-09 PROCEDURE — G8427 DOCREV CUR MEDS BY ELIG CLIN: HCPCS | Performed by: PHYSICIAN ASSISTANT

## 2018-04-09 PROCEDURE — 99213 OFFICE O/P EST LOW 20 MIN: CPT | Performed by: PHYSICIAN ASSISTANT

## 2018-04-09 PROCEDURE — G8417 CALC BMI ABV UP PARAM F/U: HCPCS | Performed by: PHYSICIAN ASSISTANT

## 2018-04-09 PROCEDURE — 1036F TOBACCO NON-USER: CPT | Performed by: PHYSICIAN ASSISTANT

## 2018-06-14 ENCOUNTER — OFFICE VISIT (OUTPATIENT)
Dept: CARDIOLOGY CLINIC | Age: 38
End: 2018-06-14
Payer: COMMERCIAL

## 2018-06-14 VITALS
HEIGHT: 70 IN | DIASTOLIC BLOOD PRESSURE: 60 MMHG | WEIGHT: 191 LBS | HEART RATE: 56 BPM | SYSTOLIC BLOOD PRESSURE: 124 MMHG | BODY MASS INDEX: 27.35 KG/M2

## 2018-06-14 DIAGNOSIS — Z98.890 S/P CARDIAC CATH: Primary | ICD-10-CM

## 2018-06-14 PROCEDURE — G8417 CALC BMI ABV UP PARAM F/U: HCPCS | Performed by: INTERNAL MEDICINE

## 2018-06-14 PROCEDURE — 1036F TOBACCO NON-USER: CPT | Performed by: INTERNAL MEDICINE

## 2018-06-14 PROCEDURE — G8427 DOCREV CUR MEDS BY ELIG CLIN: HCPCS | Performed by: INTERNAL MEDICINE

## 2018-06-14 PROCEDURE — 93000 ELECTROCARDIOGRAM COMPLETE: CPT | Performed by: INTERNAL MEDICINE

## 2018-06-14 PROCEDURE — 99212 OFFICE O/P EST SF 10 MIN: CPT | Performed by: INTERNAL MEDICINE

## 2018-06-18 DIAGNOSIS — Z98.84 S/P LAPAROSCOPIC SLEEVE GASTRECTOMY: ICD-10-CM

## 2018-06-18 DIAGNOSIS — K21.9 GASTROESOPHAGEAL REFLUX DISEASE, ESOPHAGITIS PRESENCE NOT SPECIFIED: ICD-10-CM

## 2018-06-18 DIAGNOSIS — E66.9 OBESITY, CLASS II, BMI 35-39.9: ICD-10-CM

## 2018-06-18 RX ORDER — OMEPRAZOLE 40 MG/1
CAPSULE, DELAYED RELEASE ORAL
Qty: 30 CAPSULE | Refills: 2 | Status: SHIPPED | OUTPATIENT
Start: 2018-06-18 | End: 2018-12-10 | Stop reason: SDUPTHER

## 2018-06-26 ENCOUNTER — OFFICE VISIT (OUTPATIENT)
Dept: BARIATRICS/WEIGHT MGMT | Age: 38
End: 2018-06-26
Payer: COMMERCIAL

## 2018-06-26 ENCOUNTER — OFFICE VISIT (OUTPATIENT)
Dept: BARIATRICS/WEIGHT MGMT | Age: 38
End: 2018-06-26

## 2018-06-26 VITALS
TEMPERATURE: 98.2 F | HEIGHT: 69 IN | BODY MASS INDEX: 26.96 KG/M2 | SYSTOLIC BLOOD PRESSURE: 120 MMHG | DIASTOLIC BLOOD PRESSURE: 72 MMHG | WEIGHT: 182 LBS | HEART RATE: 62 BPM

## 2018-06-26 DIAGNOSIS — Z98.84 S/P LAPAROSCOPIC SLEEVE GASTRECTOMY: Primary | ICD-10-CM

## 2018-06-26 DIAGNOSIS — K21.9 GASTROESOPHAGEAL REFLUX DISEASE, ESOPHAGITIS PRESENCE NOT SPECIFIED: ICD-10-CM

## 2018-06-26 DIAGNOSIS — E66.3 OVERWEIGHT (BMI 25.0-29.9): ICD-10-CM

## 2018-06-26 PROCEDURE — G8417 CALC BMI ABV UP PARAM F/U: HCPCS | Performed by: PHYSICIAN ASSISTANT

## 2018-06-26 PROCEDURE — 99213 OFFICE O/P EST LOW 20 MIN: CPT | Performed by: PHYSICIAN ASSISTANT

## 2018-06-26 PROCEDURE — G8427 DOCREV CUR MEDS BY ELIG CLIN: HCPCS | Performed by: PHYSICIAN ASSISTANT

## 2018-06-26 PROCEDURE — 1036F TOBACCO NON-USER: CPT | Performed by: PHYSICIAN ASSISTANT

## 2018-12-10 ENCOUNTER — OFFICE VISIT (OUTPATIENT)
Dept: BARIATRICS/WEIGHT MGMT | Age: 38
End: 2018-12-10
Payer: COMMERCIAL

## 2018-12-10 VITALS
TEMPERATURE: 98.1 F | SYSTOLIC BLOOD PRESSURE: 120 MMHG | BODY MASS INDEX: 26.66 KG/M2 | HEART RATE: 58 BPM | WEIGHT: 180 LBS | DIASTOLIC BLOOD PRESSURE: 64 MMHG | HEIGHT: 69 IN

## 2018-12-10 DIAGNOSIS — Z98.84 STATUS POST LAPAROSCOPIC SLEEVE GASTRECTOMY: Primary | ICD-10-CM

## 2018-12-10 DIAGNOSIS — K91.2 POSTSURGICAL MALABSORPTION: ICD-10-CM

## 2018-12-10 DIAGNOSIS — Z13.21 SCREENING FOR MALNUTRITION: ICD-10-CM

## 2018-12-10 DIAGNOSIS — K21.9 GASTROESOPHAGEAL REFLUX DISEASE, ESOPHAGITIS PRESENCE NOT SPECIFIED: ICD-10-CM

## 2018-12-10 DIAGNOSIS — E66.3 OVERWEIGHT (BMI 25.0-29.9): ICD-10-CM

## 2018-12-10 PROCEDURE — G8427 DOCREV CUR MEDS BY ELIG CLIN: HCPCS | Performed by: PHYSICIAN ASSISTANT

## 2018-12-10 PROCEDURE — 99213 OFFICE O/P EST LOW 20 MIN: CPT | Performed by: PHYSICIAN ASSISTANT

## 2018-12-10 PROCEDURE — G8484 FLU IMMUNIZE NO ADMIN: HCPCS | Performed by: PHYSICIAN ASSISTANT

## 2018-12-10 PROCEDURE — G8417 CALC BMI ABV UP PARAM F/U: HCPCS | Performed by: PHYSICIAN ASSISTANT

## 2018-12-10 PROCEDURE — 1036F TOBACCO NON-USER: CPT | Performed by: PHYSICIAN ASSISTANT

## 2018-12-10 RX ORDER — OMEPRAZOLE 40 MG/1
CAPSULE, DELAYED RELEASE ORAL
Qty: 90 CAPSULE | Refills: 1 | Status: SHIPPED | OUTPATIENT
Start: 2018-12-10 | End: 2020-01-30

## 2018-12-10 NOTE — PROGRESS NOTES
[facility] PROFESSIONAL SERVS  \[facility]\""S [facility] CENTER  [address], [address]  [address] [address]  Dept: [phone]  Loc: [phone]      Visit Date:  12/10/2018  Weight Management Postop Follow-up    HPI:      Thai Loera is a 41 y.o. male who is here today for 18 months follow up since  robotic-assisted sleeve gastrectomy performed by Dr. Nadya Siddiqi  on 6/26/17. Doing well. Feeling great. Staying on track with proper nutrition. Down 2# since last visit. Down 80# since surgery. BMI 26 . Drinking  plenty of fluids. Getting in plenty of protein. Continues to drink protein shakes 1-2 daily. No carbonation. No sweets. Tolerating post-op diet. No problems with bowel movements. No nausea. No emesis. No GERD/ Reflux-continues to take PPI at times. Denies CP/SOB. No Dizziness. No abdominal pain. No incisional discomfort. No sx of dehydration. Taking and tolerating all vitamins. No questions or concerns today. Physical Activity: Active to work. No dedicated activity outside of work. No strength training-advised to start. Current BMI: Body mass index is 26.58 kg/m².   Current Weight:   Wt Readings from Last 3 Encounters:   12/10/18 180 lb (81.6 kg)   06/26/18 182 lb (82.6 kg)   06/14/18 191 lb (86.6 kg)     Pre-op Body Weight: 260  Initial: 278    Past Medical History:  Past Medical History:   Diagnosis Date    Abnormal liver function tests     Congenital absence of one kidney     GERD (gastroesophageal reflux disease)     Hypertension     Hypertension, essential, benign        Past Surgical History:  Past Surgical History:   Procedure Laterality Date    CARDIAC CATHETERIZATION  07/06/2016    CHOLECYST[address]OMY  07/11/2016    [address]    CYST REMOVAL Right 6 months old    testical-non cancerous    HERNIA REPAIR  6 months old    inguinal    LIVER BIOPSY  2012    STOMACH SURGERY      gastric sleeve    WISDOM TOOTH EXTRACTION         Past Social History:  Social History     Social History   

## 2019-01-07 ENCOUNTER — APPOINTMENT (OUTPATIENT)
Dept: GENERAL RADIOLOGY | Age: 39
End: 2019-01-07
Payer: COMMERCIAL

## 2019-01-07 ENCOUNTER — HOSPITAL ENCOUNTER (EMERGENCY)
Age: 39
Discharge: HOME | End: 2019-01-07
Payer: COMMERCIAL

## 2019-01-07 VITALS
TEMPERATURE: 100.3 F | OXYGEN SATURATION: 97 % | DIASTOLIC BLOOD PRESSURE: 59 MMHG | HEIGHT: 69 IN | WEIGHT: 180 LBS | SYSTOLIC BLOOD PRESSURE: 121 MMHG | RESPIRATION RATE: 17 BRPM | HEART RATE: 88 BPM | BODY MASS INDEX: 26.66 KG/M2

## 2019-01-07 DIAGNOSIS — J11.1 INFLUENZA WITH RESPIRATORY MANIFESTATION OTHER THAN PNEUMONIA: Primary | ICD-10-CM

## 2019-01-07 LAB
ALBUMIN SERPL-MCNC: 4.1 G/DL (ref 3.5–5.1)
ALP BLD-CCNC: 65 U/L (ref 38–126)
ALT SERPL-CCNC: 31 U/L (ref 11–66)
ANION GAP SERPL CALCULATED.3IONS-SCNC: 12 MEQ/L (ref 8–16)
AST SERPL-CCNC: 32 U/L (ref 5–40)
BASOPHILS # BLD: 0.3 %
BASOPHILS ABSOLUTE: 0 THOU/MM3 (ref 0–0.1)
BILIRUB SERPL-MCNC: 1 MG/DL (ref 0.3–1.2)
BUN BLDV-MCNC: 11 MG/DL (ref 7–22)
CALCIUM SERPL-MCNC: 8.7 MG/DL (ref 8.5–10.5)
CHLORIDE BLD-SCNC: 101 MEQ/L (ref 98–111)
CO2: 25 MEQ/L (ref 23–33)
CREAT SERPL-MCNC: 0.7 MG/DL (ref 0.4–1.2)
EOSINOPHIL # BLD: 0 %
EOSINOPHILS ABSOLUTE: 0 THOU/MM3 (ref 0–0.4)
ERYTHROCYTE [DISTWIDTH] IN BLOOD BY AUTOMATED COUNT: 11.6 % (ref 11.5–14.5)
ERYTHROCYTE [DISTWIDTH] IN BLOOD BY AUTOMATED COUNT: 40 FL (ref 35–45)
FLU A ANTIGEN: POSITIVE
FLU B ANTIGEN: NEGATIVE
GFR SERPL CREATININE-BSD FRML MDRD: > 90 ML/MIN/1.73M2
GLUCOSE BLD-MCNC: 115 MG/DL (ref 70–108)
GROUP A STREP CULTURE, REFLEX: NEGATIVE
HCT VFR BLD CALC: 42.5 % (ref 42–52)
HEMOGLOBIN: 14.3 GM/DL (ref 14–18)
IMMATURE GRANS (ABS): 0.01 THOU/MM3 (ref 0–0.07)
IMMATURE GRANULOCYTES: 0.2 %
LYMPHOCYTES # BLD: 23.4 %
LYMPHOCYTES ABSOLUTE: 1.5 THOU/MM3 (ref 1–4.8)
MCH RBC QN AUTO: 31.6 PG (ref 26–33)
MCHC RBC AUTO-ENTMCNC: 33.6 GM/DL (ref 32.2–35.5)
MCV RBC AUTO: 94 FL (ref 80–94)
MONOCYTES # BLD: 12.6 %
MONOCYTES ABSOLUTE: 0.8 THOU/MM3 (ref 0.4–1.3)
NUCLEATED RED BLOOD CELLS: 0 /100 WBC
OSMOLALITY CALCULATION: 276 MOSMOL/KG (ref 275–300)
PLATELET # BLD: 174 THOU/MM3 (ref 130–400)
PMV BLD AUTO: 10.2 FL (ref 9.4–12.4)
POTASSIUM SERPL-SCNC: 3.4 MEQ/L (ref 3.5–5.2)
RBC # BLD: 4.52 MILL/MM3 (ref 4.7–6.1)
REFLEX THROAT C + S: NORMAL
SEG NEUTROPHILS: 63.5 %
SEGMENTED NEUTROPHILS ABSOLUTE COUNT: 4.1 THOU/MM3 (ref 1.8–7.7)
SODIUM BLD-SCNC: 138 MEQ/L (ref 135–145)
TOTAL PROTEIN: 7.4 G/DL (ref 6.1–8)
WBC # BLD: 6.5 THOU/MM3 (ref 4.8–10.8)

## 2019-01-07 PROCEDURE — 80053 COMPREHEN METABOLIC PANEL: CPT

## 2019-01-07 PROCEDURE — 96374 THER/PROPH/DIAG INJ IV PUSH: CPT

## 2019-01-07 PROCEDURE — 2580000003 HC RX 258: Performed by: EMERGENCY MEDICINE

## 2019-01-07 PROCEDURE — 85025 COMPLETE CBC W/AUTO DIFF WBC: CPT

## 2019-01-07 PROCEDURE — 6360000002 HC RX W HCPCS: Performed by: EMERGENCY MEDICINE

## 2019-01-07 PROCEDURE — 87880 STREP A ASSAY W/OPTIC: CPT

## 2019-01-07 PROCEDURE — 87070 CULTURE OTHR SPECIMN AEROBIC: CPT

## 2019-01-07 PROCEDURE — 71045 X-RAY EXAM CHEST 1 VIEW: CPT

## 2019-01-07 PROCEDURE — 99283 EMERGENCY DEPT VISIT LOW MDM: CPT

## 2019-01-07 PROCEDURE — 36415 COLL VENOUS BLD VENIPUNCTURE: CPT

## 2019-01-07 PROCEDURE — 87804 INFLUENZA ASSAY W/OPTIC: CPT

## 2019-01-07 RX ORDER — KETOROLAC TROMETHAMINE 30 MG/ML
30 INJECTION, SOLUTION INTRAMUSCULAR; INTRAVENOUS ONCE
Status: COMPLETED | OUTPATIENT
Start: 2019-01-07 | End: 2019-01-07

## 2019-01-07 RX ORDER — OSELTAMIVIR PHOSPHATE 75 MG/1
75 CAPSULE ORAL 2 TIMES DAILY
Qty: 10 CAPSULE | Refills: 0 | Status: SHIPPED | OUTPATIENT
Start: 2019-01-07 | End: 2019-01-12

## 2019-01-07 RX ORDER — 0.9 % SODIUM CHLORIDE 0.9 %
1000 INTRAVENOUS SOLUTION INTRAVENOUS ONCE
Status: COMPLETED | OUTPATIENT
Start: 2019-01-07 | End: 2019-01-07

## 2019-01-07 RX ADMIN — KETOROLAC TROMETHAMINE 30 MG: 30 INJECTION, SOLUTION INTRAMUSCULAR at 09:23

## 2019-01-07 RX ADMIN — SODIUM CHLORIDE 1000 ML: 9 INJECTION, SOLUTION INTRAVENOUS at 09:24

## 2019-01-07 ASSESSMENT — ENCOUNTER SYMPTOMS
SHORTNESS OF BREATH: 0
EYE DISCHARGE: 0
COUGH: 1
WHEEZING: 0
BACK PAIN: 0
ABDOMINAL PAIN: 0
DIARRHEA: 0
RHINORRHEA: 0
NAUSEA: 1
EYE REDNESS: 0
SORE THROAT: 0
VOMITING: 0

## 2019-01-07 ASSESSMENT — PAIN SCALES - GENERAL: PAINLEVEL_OUTOF10: 7

## 2019-01-09 LAB — THROAT/NOSE CULTURE: NORMAL

## 2019-05-15 ENCOUNTER — HOSPITAL ENCOUNTER (OUTPATIENT)
Age: 39
Setting detail: SPECIMEN
Discharge: HOME | End: 2019-05-15
Payer: COMMERCIAL

## 2019-05-15 DIAGNOSIS — Z13.21 SCREENING FOR MALNUTRITION: ICD-10-CM

## 2019-05-15 DIAGNOSIS — K91.2 POSTSURGICAL MALABSORPTION: ICD-10-CM

## 2019-05-15 DIAGNOSIS — Z98.84 STATUS POST LAPAROSCOPIC SLEEVE GASTRECTOMY: ICD-10-CM

## 2019-05-15 DIAGNOSIS — E66.3 OVERWEIGHT (BMI 25.0-29.9): ICD-10-CM

## 2019-05-15 LAB
ABSOLUTE EOS #: 0.06 K/UL (ref 0–0.44)
ABSOLUTE IMMATURE GRANULOCYTE: <0.03 K/UL (ref 0–0.3)
ABSOLUTE LYMPH #: 2.62 K/UL (ref 1.1–3.7)
ABSOLUTE MONO #: 0.44 K/UL (ref 0.1–1.2)
ALBUMIN SERPL-MCNC: 4.7 G/DL (ref 3.5–5.2)
ALBUMIN/GLOBULIN RATIO: 1.8 (ref 1–2.5)
ALP BLD-CCNC: 53 U/L (ref 40–129)
ALT SERPL-CCNC: 20 U/L (ref 5–41)
ANION GAP SERPL CALCULATED.3IONS-SCNC: 14 MMOL/L (ref 9–17)
AST SERPL-CCNC: 24 U/L
BASOPHILS # BLD: 1 % (ref 0–2)
BASOPHILS ABSOLUTE: 0.04 K/UL (ref 0–0.2)
BILIRUB SERPL-MCNC: 0.78 MG/DL (ref 0.3–1.2)
BUN BLDV-MCNC: 20 MG/DL (ref 6–20)
BUN/CREAT BLD: NORMAL (ref 9–20)
CALCIUM SERPL-MCNC: 9.6 MG/DL (ref 8.6–10.4)
CHLORIDE BLD-SCNC: 101 MMOL/L (ref 98–107)
CHOLESTEROL/HDL RATIO: 1.9
CHOLESTEROL: 160 MG/DL
CO2: 28 MMOL/L (ref 20–31)
CREAT SERPL-MCNC: 0.78 MG/DL (ref 0.7–1.2)
DIFFERENTIAL TYPE: NORMAL
EOSINOPHILS RELATIVE PERCENT: 1 % (ref 1–4)
ESTIMATED AVERAGE GLUCOSE: 91 MG/DL
FERRITIN: 143 UG/L (ref 30–400)
FOLATE: 17.3 NG/ML
GFR AFRICAN AMERICAN: >60 ML/MIN
GFR NON-AFRICAN AMERICAN: >60 ML/MIN
GFR SERPL CREATININE-BSD FRML MDRD: NORMAL ML/MIN/{1.73_M2}
GFR SERPL CREATININE-BSD FRML MDRD: NORMAL ML/MIN/{1.73_M2}
GLUCOSE BLD-MCNC: 80 MG/DL (ref 70–99)
HBA1C MFR BLD: 4.8 % (ref 4–6)
HCT VFR BLD CALC: 44.4 % (ref 40.7–50.3)
HDLC SERPL-MCNC: 84 MG/DL
HEMOGLOBIN: 14.2 G/DL (ref 13–17)
IMMATURE GRANULOCYTES: 0 %
IRON: 71 UG/DL (ref 59–158)
LDL CHOLESTEROL: 67 MG/DL (ref 0–130)
LYMPHOCYTES # BLD: 40 % (ref 24–43)
MCH RBC QN AUTO: 31.6 PG (ref 25.2–33.5)
MCHC RBC AUTO-ENTMCNC: 32 G/DL (ref 28.4–34.8)
MCV RBC AUTO: 98.7 FL (ref 82.6–102.9)
MONOCYTES # BLD: 7 % (ref 3–12)
NRBC AUTOMATED: 0 PER 100 WBC
PDW BLD-RTO: 12.6 % (ref 11.8–14.4)
PLATELET # BLD: 239 K/UL (ref 138–453)
PLATELET ESTIMATE: NORMAL
PMV BLD AUTO: 10.4 FL (ref 8.1–13.5)
POTASSIUM SERPL-SCNC: 3.8 MMOL/L (ref 3.7–5.3)
PREALBUMIN: 25.8 MG/DL (ref 20–40)
PTH INTACT: 38.4 PG/ML (ref 15–65)
RBC # BLD: 4.5 M/UL (ref 4.21–5.77)
RBC # BLD: NORMAL 10*6/UL
SEG NEUTROPHILS: 51 % (ref 36–65)
SEGMENTED NEUTROPHILS ABSOLUTE COUNT: 3.4 K/UL (ref 1.5–8.1)
SODIUM BLD-SCNC: 143 MMOL/L (ref 135–144)
TOTAL PROTEIN: 7.3 G/DL (ref 6.4–8.3)
TRIGL SERPL-MCNC: 43 MG/DL
TSH SERPL DL<=0.05 MIU/L-ACNC: 2.59 MIU/L (ref 0.3–5)
VITAMIN B-12: 646 PG/ML (ref 232–1245)
VITAMIN D 25-HYDROXY: 74.1 NG/ML (ref 30–100)
VLDLC SERPL CALC-MCNC: NORMAL MG/DL (ref 1–30)
WBC # BLD: 6.6 K/UL (ref 3.5–11.3)
WBC # BLD: NORMAL 10*3/UL

## 2019-05-16 ENCOUNTER — HOSPITAL ENCOUNTER (OUTPATIENT)
Dept: WOMENS IMAGING | Age: 39
Discharge: HOME | End: 2019-05-16
Payer: COMMERCIAL

## 2019-05-16 DIAGNOSIS — Z98.84 STATUS POST LAPAROSCOPIC SLEEVE GASTRECTOMY: ICD-10-CM

## 2019-05-16 DIAGNOSIS — K91.2 POSTSURGICAL MALABSORPTION: ICD-10-CM

## 2019-05-16 DIAGNOSIS — E66.3 OVERWEIGHT (BMI 25.0-29.9): ICD-10-CM

## 2019-05-16 DIAGNOSIS — Z13.21 SCREENING FOR MALNUTRITION: ICD-10-CM

## 2019-05-16 PROCEDURE — 77080 DXA BONE DENSITY AXIAL: CPT

## 2019-05-22 LAB — VITAMIN B1 WHOLE BLOOD: 178 NMOL/L (ref 70–180)

## 2019-06-03 ENCOUNTER — OFFICE VISIT (OUTPATIENT)
Dept: BARIATRICS/WEIGHT MGMT | Age: 39
End: 2019-06-03
Payer: COMMERCIAL

## 2019-06-03 VITALS
BODY MASS INDEX: 27.11 KG/M2 | DIASTOLIC BLOOD PRESSURE: 76 MMHG | HEART RATE: 60 BPM | HEIGHT: 69 IN | WEIGHT: 183 LBS | TEMPERATURE: 98 F | SYSTOLIC BLOOD PRESSURE: 132 MMHG

## 2019-06-03 DIAGNOSIS — K21.9 GASTROESOPHAGEAL REFLUX DISEASE, ESOPHAGITIS PRESENCE NOT SPECIFIED: ICD-10-CM

## 2019-06-03 DIAGNOSIS — Z98.84 STATUS POST LAPAROSCOPIC SLEEVE GASTRECTOMY: Primary | ICD-10-CM

## 2019-06-03 DIAGNOSIS — E66.3 OVERWEIGHT (BMI 25.0-29.9): ICD-10-CM

## 2019-06-03 PROCEDURE — G8427 DOCREV CUR MEDS BY ELIG CLIN: HCPCS | Performed by: PHYSICIAN ASSISTANT

## 2019-06-03 PROCEDURE — 1036F TOBACCO NON-USER: CPT | Performed by: PHYSICIAN ASSISTANT

## 2019-06-03 PROCEDURE — G8417 CALC BMI ABV UP PARAM F/U: HCPCS | Performed by: PHYSICIAN ASSISTANT

## 2019-06-03 PROCEDURE — 99213 OFFICE O/P EST LOW 20 MIN: CPT | Performed by: PHYSICIAN ASSISTANT

## 2019-06-03 NOTE — PROGRESS NOTES
[facility]  [facility]  [address] , [address]  [address] [address]  Dept: [phone]  Loc: [phone]      Visit Date:  6/3/2019  Weight Management Postop Follow-up    HPI:      Shila Gorman is a 46 y.o. male who is here today for 2 year follow up since  robotic-assisted sleeve gastrectomy performed by Dr. Gold Tsai  on 6/26/17. Doing very well. Up 3# since last visit. Down 77# since surgery. BMI 27 . Drinking plenty of fluids. Getting in plenty of protein. Eating 2 protein bars daily. Drinking 1 beer most days of the week. No sweets. Tolerating post-op diet. No problems with bowel movements. No nausea. No emesis. No GERD/ Reflux-continues to take PPI 3 times weekly. Denies CP/SOB. No Dizziness. No abdominal pain. No incisional discomfort. No sx of dehydration. No fever or chills. Taking and tolerating all vitamins. 2 year labs reviewed. Seca scale completed and reviewed. Physical Activity: No dedicated physical activity outside of work. Encouraged strength training. Current BMI: Body mass index is 27.02 kg/m².   Current Weight:   Wt Readings from Last 3 Encounters:   06/03/19 183 lb (83 kg)   01/07/19 180 lb (81.6 kg)   12/10/18 180 lb (81.6 kg)     Pre-op Body Weight: 260  Initial: 278    Past Medical History:  Past Medical History:   Diagnosis Date    Abnormal liver function tests     Congenital absence of one kidney     GERD (gastroesophageal reflux disease)     Hypertension     Hypertension, essential, benign        Past Surgical History:  Past Surgical History:   Procedure Laterality Date    CARDIAC CATHETERIZATION  07/06/2016    CHOLECYSTECTOMY  07/11/2016    [address]    CYST REMOVAL Right 6 months old    testical-non cancerous    HERNIA REPAIR  6 months old    inguinal    LIVER BIOPSY  2012    STOMACH SURGERY      gastric sleeve    WISDOM TOOTH EXTRACTION         Past Social History:  Social History     Socioeconomic History    Marital status:      Spouse name: Not on file    Number of children: Not on file    Years of education: Not on file    Highest education level: Not on file   Occupational History    Not on file   Social Needs    Financial resource strain: Not on file    Food insecurity:     Worry: Not on file     Inability: Not on file    Transportation needs:     Medical: Not on file     Non-medical: Not on file   Tobacco Use    Smoking status: Former Smoker     Last attempt to quit: 1/1/2009     Years since quitting: 10.4    Smokeless tobacco: Former User     Quit date: 11/1/2012   Substance and Sexual Activity    Alcohol use: No     Alcohol/week: 0.0 oz    Drug use: No    Sexual activity: Not on file   Lifestyle    Physical activity:     Days per week: Not on file     Minutes per session: Not on file    Stress: Not on file   Relationships    Social connections:     Talks on phone: Not on file     Gets together: Not on file     Attends Worship service: Not on file     Active member of club or organization: Not on file     Attends meetings of clubs or organizations: Not on file     Relationship status: Not on file    Intimate partner violence:     Fear of current or ex partner: Not on file     Emotionally abused: Not on file     Physically abused: Not on file     Forced sexual activity: Not on file   Other Topics Concern    Not on file   Social History Narrative    Not on file        Medications:   Current Outpatient Medications   Medication Sig Dispense Refill    omeprazole (PRILOSEC) 40 MG delayed release capsule TAKE 1 CAPSULE BY MOUTH ONCE DAILY 90 capsule 1    Calcium Citrate 250 MG TABS Take 500 mg by mouth 2 times daily       Multiple Vitamin (MVI, CELEBRATE, CHEWABLE TABLET) Take 3 tablets by mouth daily Takes 2 pills in the am and 1 pill in the pm.  This Multivitamin has extra iron in it.       LUCRECIA LO 40 40 % cream Apply topically nightly as needed       cetirizine (ZYRTEC ALLERGY) 10 MG tablet Take 1 tablet by mouth daily 30 tablet 0    ketoconazole (NIZORAL) 2 % shampoo Apply topically daily as needed for Itching Apply topically daily as needed. No current facility-administered medications for this visit. Allergies: Allergies   Allergen Reactions    Ciprofloxacin Hives    Other Dermatitis     \"tide\" laundry soap       Subjective:    Constitutional: Denies any fever, chills, fatigue. Wound: Denies any rash, skin color changes or wound problems. Resp: Denies any cough, shortness of breath. CV: Denies any chest pain, orthopnea or syncope. MS: Denies myalgias, arthralgias. GI: Denies any nausea, vomiting, diarrhea, constipation, melena, hematochezia. No incisional discomfort. : Denies any hematuria, hesitancy or dysuria. NEURO: Denies seizures, headache. Objective:    /76 (Site: Left Upper Arm, Position: Sitting, Cuff Size: Large Adult)   Pulse 60   Temp 98 °F (36.7 °C) (Oral)   Ht 5' 9\" (1.753 m)   Wt 183 lb (83 kg)   BMI 27.02 kg/m²     Physical Examination:   Constitutional: Alert and oriented to person, place and time. Well-developed, well- nourished. Head: Normocephalic and atraumatic  Neck: Supple. Eyes: EOMI b/l. Conjunctivae normal.  No scleral icterus. Respiratory: Effort normal. No respiratory distress. Abd: Benign  Ext:  Movement x 4. No edema  Skin; Warm and dry, no visible rashes, lesions or ulcers.    Neuro: Cranial Nerves Grossly Intact; nml coordination      Labs:  CBC   Lab Results   Component Value Date    WBC 6.6 05/15/2019    RBC 4.50 05/15/2019    HGB 14.2 05/15/2019    HCT 44.4 05/15/2019    MCV 98.7 05/15/2019    MCH 31.6 05/15/2019    MCHC 32.0 05/15/2019    RDW 12.6 05/15/2019     05/15/2019    MPV 10.4 05/15/2019    RBCMORP NOT REPORTED 05/15/2019    SEGSPCT 63.5 01/07/2019    LABLYMP 23.4 01/07/2019    MONOPCT 7 05/15/2019    LABEOS 0.0 01/07/2019    BASO 0.3 01/07/2019    NRBC 0 01/07/2019    SEGSABS 4.1 01/07/2019    LYMPHSABS 2.62 05/15/2019    LYMPHSABS 1.5 01/07/2019    MONOSABS 0.44 05/15/2019    MONOSABS 0.8 01/07/2019    EOSABS 0.06 05/15/2019    EOSABS 0.0 01/07/2019    BASOSABS 0.04 05/15/2019        BMP/CMP   Lab Results   Component Value Date    GLUCOSE 80 05/15/2019    CREATININE 0.78 05/15/2019    BUN 20 05/15/2019     05/15/2019    K 3.8 05/15/2019     05/15/2019    CO2 28 05/15/2019    CALCIUM 9.6 05/15/2019    AST 24 05/15/2019    ALKPHOS 53 05/15/2019    PROT 7.3 05/15/2019    LABALBU 4.7 05/15/2019    BILITOT 0.78 05/15/2019    ALT 20 05/15/2019        PREALBUMIN   Lab Results   Component Value Date    PREALBUMIN 25.8 05/15/2019        VITAMIN B12   Lab Results   Component Value Date    [id number] 646 05/15/2019        24 HOUR URINE CALCIUM   No results found for: Ettie Divine, CALCIUMUR     VITAMIN D   Lab Results   Component Value Date    VITD25 74.1 05/15/2019        VITAMIN B1/ THIAMINE   Lab Results   Component Value Date    [id number] 178 05/15/2019        RBC FOLATE   Lab Results   Component Value Date    FOLATE 17.3 05/15/2019        LIPID SCREEN (FASTING)   Lab Results   Component Value Date    CHOL 160 05/15/2019    CHOL 157 03/07/2017    TRIG 43 05/15/2019    HDL 84 05/15/2019    LDLCALC 89 03/07/2017   ,     HGA1C (T2DM ONLY)   Lab Results   Component Value Date    LABA1C 4.8 05/15/2019    AVGG 87 12/23/2017        TSH   Lab Results   Component Value Date    TSH 2.59 05/15/2019        IRON   Lab Results   Component Value Date    IRON 71 05/15/2019        IONIZED CALCIUM   No results found for: LOER MCKEON      Assessment:       Diagnosis Orders   1. Status post laparoscopic sleeve gastrectomy     2. Overweight (BMI 25.0-29.9)     3. Gastroesophageal reflux disease, esophagitis presence not specified       Plan:    Stay well hydrated. Drink a minimum of 64 oz of non-carbonated, non-caffeinated fluids daily. Nutritional education occurred during visit. Tolerating diet.  Continue following with dietitian and follow their recommendations as directed. Continue  60-80 grams of protein each day. Signs and symptoms reviewed with patient that would be concerning and need her to return to office for re-evaluation. Patient will call if any questions or concerns arrise. Importance of physical activity discussed with patient. Increase physical activity as tolerated  Add strength training  Continue taking Multivitamin, Calcium and B12 as directed  Encouraged to attend support groups  2 year labs reviewed with patient today  SECA scale completed and reviewed with patient today  Measurements completed and reviewed with patient today  Return in about 6 months (around 12/3/2019) for postop follow up. I spent 15 minutes in direct patient care with greater than 50% spent in counseling and coordination of care.      Electronically signed by JOJO Rodriguez on 6/3/2019 at 3:38 PM

## 2020-01-30 RX ORDER — OMEPRAZOLE 40 MG/1
CAPSULE, DELAYED RELEASE ORAL
Qty: 30 CAPSULE | Refills: 0 | Status: SHIPPED | OUTPATIENT
Start: 2020-01-30 | End: 2020-01-31 | Stop reason: SDUPTHER

## 2020-01-31 RX ORDER — OMEPRAZOLE 40 MG/1
CAPSULE, DELAYED RELEASE ORAL
Qty: 30 CAPSULE | Refills: 0 | Status: SHIPPED | OUTPATIENT
Start: 2020-01-31 | End: 2021-02-18 | Stop reason: SDUPTHER

## 2020-02-21 ENCOUNTER — OFFICE VISIT (OUTPATIENT)
Dept: BARIATRICS/WEIGHT MGMT | Age: 40
End: 2020-02-21
Payer: COMMERCIAL

## 2020-02-21 VITALS
BODY MASS INDEX: 28.44 KG/M2 | RESPIRATION RATE: 18 BRPM | SYSTOLIC BLOOD PRESSURE: 126 MMHG | HEART RATE: 84 BPM | DIASTOLIC BLOOD PRESSURE: 78 MMHG | HEIGHT: 69 IN | WEIGHT: 192 LBS | TEMPERATURE: 98.2 F

## 2020-02-21 PROCEDURE — 1036F TOBACCO NON-USER: CPT | Performed by: SURGERY

## 2020-02-21 PROCEDURE — 99213 OFFICE O/P EST LOW 20 MIN: CPT | Performed by: SURGERY

## 2020-02-21 PROCEDURE — G8484 FLU IMMUNIZE NO ADMIN: HCPCS | Performed by: SURGERY

## 2020-02-21 PROCEDURE — G8427 DOCREV CUR MEDS BY ELIG CLIN: HCPCS | Performed by: SURGERY

## 2020-02-21 PROCEDURE — G8417 CALC BMI ABV UP PARAM F/U: HCPCS | Performed by: SURGERY

## 2020-02-22 ASSESSMENT — ENCOUNTER SYMPTOMS
BACK PAIN: 0
APNEA: 0
CHOKING: 0
SORE THROAT: 0
CHEST TIGHTNESS: 0
VOMITING: 0
EYE PAIN: 0
EYE DISCHARGE: 0
EYE ITCHING: 0
CONSTIPATION: 0
ABDOMINAL PAIN: 0
COLOR CHANGE: 0
PHOTOPHOBIA: 0
WHEEZING: 0
SINUS PRESSURE: 0
COUGH: 0
NAUSEA: 0
VOICE CHANGE: 0
FACIAL SWELLING: 0
ABDOMINAL DISTENTION: 0
TROUBLE SWALLOWING: 0
STRIDOR: 0
ALLERGIC/IMMUNOLOGIC NEGATIVE: 1
EYE REDNESS: 0
RHINORRHEA: 0
RECTAL PAIN: 0
DIARRHEA: 0
ANAL BLEEDING: 0
SHORTNESS OF BREATH: 0
BLOOD IN STOOL: 0

## 2020-02-22 NOTE — PROGRESS NOTES
Subjective:      Patient ID: Abebe Polanco is a 45 y.o. male. Chief Complaint   Patient presents with    Follow-up     2.5 year post op ; h&p 260lb, surgery 06/26/2017, current 192lb     HPI  Davi Son is a 78-year-old male who presents for 2-1/2-year follow-up status post sleeve gastrectomy. Weight prior to surgery was around 260 pounds. Current weight 192 pounds. He states he had been down to 178 pounds but he feels better at around 190 pounds. He has been staying well-hydrated. Avoiding carbohydrates/sugars. Maintaining protein intake. Taking multivitamin as directed. Denies any abdominal or chest pain. No shortness of breath. No urinary complaints. No hematochezia or melena. Denies significant nausea or vomiting. Admits he does have some GERD and used to take his PPI daily but now only takes antacid medication 1-2 times per week as needed. He states he feels he has been doing really well and really has no complaints or concerns. Review of Systems   Constitutional: Negative for activity change, appetite change, chills, diaphoresis, fatigue, fever and unexpected weight change. HENT: Negative for congestion, dental problem, drooling, ear discharge, ear pain, facial swelling, hearing loss, mouth sores, nosebleeds, postnasal drip, rhinorrhea, sinus pressure, sneezing, sore throat, tinnitus, trouble swallowing and voice change. Eyes: Negative for photophobia, pain, discharge, redness, itching and visual disturbance. Respiratory: Negative for apnea, cough, choking, chest tightness, shortness of breath, wheezing and stridor. Cardiovascular: Negative for chest pain, palpitations and leg swelling. Gastrointestinal: Negative for abdominal distention, abdominal pain, anal bleeding, blood in stool, constipation, diarrhea, nausea, rectal pain and vomiting. Endocrine: Negative.     Genitourinary: Negative for decreased urine volume, difficulty urinating, discharge, dysuria, enuresis, flank pain, frequency, genital sores, hematuria, penile pain, penile swelling, scrotal swelling, testicular pain and urgency. Musculoskeletal: Negative for arthralgias, back pain, gait problem, joint swelling, myalgias, neck pain and neck stiffness. Skin: Negative for color change, pallor, rash and wound. Allergic/Immunologic: Negative. Neurological: Negative for dizziness, tremors, seizures, syncope, facial asymmetry, speech difficulty, weakness, light-headedness, numbness and headaches. Hematological: Negative for adenopathy. Does not bruise/bleed easily. Psychiatric/Behavioral: Negative for agitation, behavioral problems, confusion, decreased concentration, dysphoric mood, hallucinations, self-injury, sleep disturbance and suicidal ideas. The patient is not nervous/anxious and is not hyperactive. Past Medical History:   Diagnosis Date    Abnormal liver function tests     Congenital absence of one kidney     GERD (gastroesophageal reflux disease)     Hypertension     Hypertension, essential, benign        Past Surgical History:   Procedure Laterality Date    CARDIAC CATHETERIZATION  07/06/2016    CHOLECYSTECTOMY  07/11/2016    HIXENBAUGH    CYST REMOVAL Right 6 months old    testical-non cancerous    HERNIA REPAIR  6 months old    inguinal    LIVER BIOPSY  2012    STOMACH SURGERY      gastric sleeve    WISDOM TOOTH EXTRACTION         Current Outpatient Medications   Medication Sig Dispense Refill    omeprazole (PRILOSEC) 40 MG delayed release capsule 1tab Qday 30 capsule 0    Calcium Citrate 250 MG TABS Take 500 mg by mouth 2 times daily       ketoconazole (NIZORAL) 2 % shampoo Apply topically daily as needed for Itching Apply topically daily as needed.  Multiple Vitamin (MVI, CELEBRATE, CHEWABLE TABLET) Take 3 tablets by mouth daily Takes 2 pills in the am and 1 pill in the pm.  This Multivitamin has extra iron in it.       LUCRECIA LO 40 40 % cream Apply topically nightly as needed groups  · Most recent labs reviewed with patient today  · Most recent SECA scale reviewed with patient today  · Measurements completed and reviewed with patient today  · Return in about 6 months for 3 yr postop follow up.     I spent 15 minutes in direct patient care with greater than 50% spent in counseling and coordination of care.          Jolene Gomez MD

## 2020-08-15 ENCOUNTER — HOSPITAL ENCOUNTER (EMERGENCY)
Age: 40
Discharge: HOME | End: 2020-08-15
Attending: EMERGENCY MEDICINE
Payer: COMMERCIAL

## 2020-08-15 VITALS
HEART RATE: 68 BPM | WEIGHT: 195 LBS | RESPIRATION RATE: 12 BRPM | DIASTOLIC BLOOD PRESSURE: 76 MMHG | TEMPERATURE: 98 F | SYSTOLIC BLOOD PRESSURE: 127 MMHG | OXYGEN SATURATION: 99 % | HEIGHT: 69 IN | BODY MASS INDEX: 28.88 KG/M2

## 2020-08-15 PROCEDURE — 96372 THER/PROPH/DIAG INJ SC/IM: CPT

## 2020-08-15 PROCEDURE — 99281 EMR DPT VST MAYX REQ PHY/QHP: CPT

## 2020-08-15 PROCEDURE — 6360000002 HC RX W HCPCS: Performed by: EMERGENCY MEDICINE

## 2020-08-15 PROCEDURE — 99282 EMERGENCY DEPT VISIT SF MDM: CPT

## 2020-08-15 PROCEDURE — 90715 TDAP VACCINE 7 YRS/> IM: CPT | Performed by: EMERGENCY MEDICINE

## 2020-08-15 PROCEDURE — 90471 IMMUNIZATION ADMIN: CPT | Performed by: EMERGENCY MEDICINE

## 2020-08-15 PROCEDURE — 6370000000 HC RX 637 (ALT 250 FOR IP): Performed by: EMERGENCY MEDICINE

## 2020-08-15 RX ORDER — KETOROLAC TROMETHAMINE 30 MG/ML
30 INJECTION, SOLUTION INTRAMUSCULAR; INTRAVENOUS ONCE
Status: COMPLETED | OUTPATIENT
Start: 2020-08-15 | End: 2020-08-15

## 2020-08-15 RX ORDER — NAPROXEN 500 MG/1
500 TABLET ORAL 2 TIMES DAILY
Qty: 20 TABLET | Refills: 0 | Status: SHIPPED | OUTPATIENT
Start: 2020-08-15 | End: 2021-03-11 | Stop reason: ALTCHOICE

## 2020-08-15 RX ADMIN — KETOROLAC TROMETHAMINE 30 MG: 30 INJECTION, SOLUTION INTRAMUSCULAR at 14:29

## 2020-08-15 RX ADMIN — TETANUS TOXOID, REDUCED DIPHTHERIA TOXOID AND ACELLULAR PERTUSSIS VACCINE, ADSORBED 0.5 ML: 5; 2.5; 8; 8; 2.5 SUSPENSION INTRAMUSCULAR at 14:30

## 2020-08-15 RX ADMIN — MUPIROCIN: 20 OINTMENT TOPICAL at 14:33

## 2020-08-15 ASSESSMENT — PAIN SCALES - GENERAL
PAINLEVEL_OUTOF10: 8
PAINLEVEL_OUTOF10: 8

## 2020-08-15 ASSESSMENT — PAIN DESCRIPTION - DESCRIPTORS: DESCRIPTORS: BURNING

## 2020-08-15 ASSESSMENT — PAIN DESCRIPTION - ORIENTATION: ORIENTATION: RIGHT

## 2020-08-15 ASSESSMENT — PAIN DESCRIPTION - PAIN TYPE: TYPE: ACUTE PAIN

## 2020-08-15 ASSESSMENT — PAIN DESCRIPTION - LOCATION: LOCATION: FOOT

## 2020-08-15 ASSESSMENT — ENCOUNTER SYMPTOMS: COLOR CHANGE: 0

## 2020-08-15 ASSESSMENT — PAIN DESCRIPTION - FREQUENCY: FREQUENCY: CONTINUOUS

## 2020-08-16 NOTE — ED PROVIDER NOTES
[facility] ENCOUNTER          Pt Name: Amanda Aguila  MRN: [id number]  [place] 1980  Date of evaluation: 8/15/2020  Emergency Physician: Mira Juan, [facility]       Chief Complaint   Patient presents with    Burn     History obtained from the patient. HISTORY OF PRESENT ILLNESS    HPI  Amanda Aguila is a 45 y.o. male who presents to the emergency department for evaluation of burn to right toes. Patient states he was boliing sweet corn 1 hour PTA. He states he jostled the water and water splashed onto his toes and sock. He states he felt it burn and scrambled to get his sock off. Then he noticed a blister to the dorsal 3rd toe and an unroofed blister to the dorsal 5th. Pain 7/10. Burning. No other complaints. Last tetanus greater than 10 years ago. The patient has no other acute complaints at this time. REVIEW OF SYSTEMS   Review of Systems   Skin: Positive for wound. Negative for color change and pallor. Neurological: Negative for numbness. PAST MEDICAL AND SURGICAL HISTORY     Past Medical History:   Diagnosis Date    Abnormal liver function tests     Congenital absence of one kidney     GERD (gastroesophageal reflux disease)     Hypertension     Hypertension, essential, benign      Past Surgical History:   Procedure Laterality Date    CARDIAC CATHETERIZATION  07/06/2016    CHOLE[address]Y  07/11/2016    [address]    CYST REMOVAL Right 6 months old    testical-non cancerous    HERNIA REPAIR  6 months old    inguinal    LIVER BIOPSY  2012    STOMACH SURGERY      gastric sleeve    WISDOM TOOTH EXTRACTION           MEDICATIONS   No current facility-administered medications for this encounter.      Current Outpatient Medications:     naproxen (NAPROSYN) 500 MG tablet, Take 1 tablet by mouth 2 times daily, Disp: 20 tablet, Rfl: 0    omeprazole (PRILOSEC) 40 MG delayed release capsule, 1tab Qday, Disp: 30 capsule, Rfl: 0    Calcium Citrate 250 MG TABS, Take 500 mg by mouth 2 times daily , Disp: , Rfl:     ketoconazole (NIZORAL) 2 % shampoo, Apply topically daily as needed for Itching Apply topically daily as needed. , Disp: , Rfl:     Multiple Vitamin (MVI, CELEBRATE, CHEWABLE TABLET), Take 3 tablets by mouth daily Takes 2 pills in the am and 1 pill in the pm.  This Multivitamin has extra iron in it., Disp: , Rfl:     LUCRECIA LO 40 40 % cream, Apply topically nightly as needed , Disp: , Rfl:     cetirizine (ZYRTEC ALLERGY) 10 MG tablet, Take 1 tablet by mouth daily, Disp: 30 tablet, Rfl: 0      SOCIAL HISTORY     Social History     Social History Narrative    Not on file     Social History     Tobacco Use    Smoking status: Former Smoker     Last attempt to quit: 2009     Years since quittin.6    Smokeless tobacco: Former User     Quit date: 2012   Substance Use Topics    Alcohol use: No     Alcohol/week: 0.0 standard drinks    Drug use: No         ALLERGIES     Allergies   Allergen Reactions    Ciprofloxacin Hives    Other Dermatitis     \"tide\" laundry soap         FAMILY HISTORY     Family History   Problem Relation Age of Onset    Hypertension Mother     Obesity Mother     Hypertension Father     Heart Attack Father     Cancer Maternal Uncle         testicular    Cancer Maternal Grandmother     Obesity Maternal Grandmother     Cancer Maternal Grandfather         testicular    Diabetes Maternal Grandfather     Cancer Paternal Grandfather [de-identified]        melanoma    Heart Attack Paternal Grandfather     Obesity Paternal Grandfather     Asthma Brother     Obesity Brother          PHYSICAL EXAM     ED Triage Vitals [08/15/20 1359]   BP Temp Temp Source Pulse Resp SpO2 Height Weight   127/76 98 °F (36.7 °C) Oral 68 12 99 % 5' 9\" (1.753 m) 195 lb (88.5 kg)     Initial vital signs and nursing assessment reviewed and normal. Pulsoximetry is normal per my interpretation.     Additional Vital Signs:  Vitals:    08/15/20 1359   BP: 127/76   Pulse: 68   Resp: 12   Temp: 98 °F (36.7 °C)   SpO2: 99%       Physical Exam  Vitals signs and nursing note reviewed. Cardiovascular:      Rate and Rhythm: Normal rate and regular rhythm. Pulses: Normal pulses. Heart sounds: Normal heart sounds. Skin:     Findings: Burn (1cm circumscribed 2nd area dorsal 3rd toe. 1 cm unroofed 2nd degree dorsal 5th toe. Both right foot. ) present. Comments: Sensation throughout. Neurological:      Mental Status: He is alert. MEDICAL DECISION MAKING   Initial Assessment: Patient presented for evaluation of small uncomplicated 2nd degree burn. The vitals signs and physical exam were notable for 1 blister and 1 unroofed blister. No 3rd degree. Given these findings the emergent condition that needed addressed/further defined were wound care. Given the patient minor injury. I though the risk of wound complication was exceedingly low therefore further work-up was not indicated at this time. Plan: tetanus pain control, ab ointment, Work note per patient request.        ED RESULTS   Laboratory results:  Labs Reviewed - No data to display    Radiologic studies results:  No orders to display       ED Medications administered this visit:   Medications   Tetanus-Diphth-Acell Pertussis (BOOSTRIX) injection 0.5 mL (0.5 mLs Intramuscular Given 8/15/20 1430)   ketorolac (TORADOL) injection 30 mg (30 mg Intramuscular Given 8/15/20 1429)         ED COURSE      The diagnosis, extensive differential diagnosis, laboratory/imaging findings, and return precautions were discussed at the bedside. The patient was an active participant in their care. They are agreeable to the plan of care. All questions and concerns were addressed at the time of the encounter.   MEDICATION CHANGES     DISCHARGE MEDICATIONS:  Discharge Medication List as of 8/15/2020  2:42 PM      START taking these medications    Details   naproxen (NAPROSYN) 500 MG tablet Take 1 tablet by mouth 2 times daily, Disp-20 tablet,R-0Print                  FINAL DISPOSITION     Final diagnoses:   Second degree burn of fifth toe of foot, right, initial encounter   Partial thickness burn of right third toe, initial encounter     Condition: condition: good  Dispo: Discharge to home    PATIENT REFERRED TO:  AGUSTIN Gonzalez - NIM[address] Sil[address]  [address]  [phone]    Schedule an appointment as soon as possible for a visit in 5 days  For wound re-check        This transcription was electronically signed. Parts of this transcriptions may have been dictated by use of voice recognition software and electronically transcribed, and parts may have been transcribed with the assistance of an ED scribe. The transcription may contain errors not detected in proofreading. Please refer to my supervising physician's documentation if my documentation differs.     Electronically Signed: Syd Rubio, 08/15/20, 9:04 PM      Syd Rubio DO  08/15/20 0997

## 2020-11-21 ENCOUNTER — HOSPITAL ENCOUNTER (EMERGENCY)
Age: 40
Discharge: HOME | End: 2020-11-21
Payer: COMMERCIAL

## 2020-11-21 VITALS
RESPIRATION RATE: 16 BRPM | HEART RATE: 78 BPM | OXYGEN SATURATION: 98 % | DIASTOLIC BLOOD PRESSURE: 78 MMHG | TEMPERATURE: 99.8 F | SYSTOLIC BLOOD PRESSURE: 132 MMHG

## 2020-11-21 PROCEDURE — U0003 INFECTIOUS AGENT DETECTION BY NUCLEIC ACID (DNA OR RNA); SEVERE ACUTE RESPIRATORY SYNDROME CORONAVIRUS 2 (SARS-COV-2) (CORONAVIRUS DISEASE [COVID-19]), AMPLIFIED PROBE TECHNIQUE, MAKING USE OF HIGH THROUGHPUT TECHNOLOGIES AS DESCRIBED BY CMS-2020-01-R: HCPCS

## 2020-11-21 PROCEDURE — 99213 OFFICE O/P EST LOW 20 MIN: CPT | Performed by: NURSE PRACTITIONER

## 2020-11-21 PROCEDURE — 99213 OFFICE O/P EST LOW 20 MIN: CPT

## 2020-11-21 RX ORDER — DOXYCYCLINE HYCLATE 100 MG
100 TABLET ORAL 2 TIMES DAILY
Qty: 14 TABLET | Refills: 0 | Status: SHIPPED | OUTPATIENT
Start: 2020-11-21 | End: 2020-11-28

## 2020-11-21 RX ORDER — ALBUTEROL SULFATE 90 UG/1
2 AEROSOL, METERED RESPIRATORY (INHALATION) EVERY 4 HOURS PRN
Qty: 1 INHALER | Refills: 0 | Status: SHIPPED | OUTPATIENT
Start: 2020-11-21 | End: 2021-03-11 | Stop reason: ALTCHOICE

## 2020-11-21 ASSESSMENT — ENCOUNTER SYMPTOMS
COUGH: 1
VOMITING: 0
SORE THROAT: 1
SHORTNESS OF BREATH: 0
SINUS PRESSURE: 1
NAUSEA: 0
DIARRHEA: 0

## 2020-11-21 ASSESSMENT — PAIN DESCRIPTION - FREQUENCY: FREQUENCY: CONTINUOUS

## 2020-11-21 ASSESSMENT — PAIN DESCRIPTION - PAIN TYPE: TYPE: ACUTE PAIN

## 2020-11-21 ASSESSMENT — PAIN SCALES - GENERAL: PAINLEVEL_OUTOF10: 4

## 2020-11-21 ASSESSMENT — PAIN DESCRIPTION - LOCATION: LOCATION: HEAD

## 2020-11-21 NOTE — ED PROVIDER NOTES
[facility]  Urgent Care Encounter       CHIEF COMPLAINT       Chief Complaint   Patient presents with    Cough    Nasal Congestion    Generalized Body Aches       Nurses Notes reviewed and I agree except as noted in the HPI. HISTORY OF PRESENT ILLNESS   Félix Mills is a 45 y.o. male who presents for evaluation of cough, congestion, body aches, sore throat and headache that been ongoing for the past week. Patient denies any loss of smell or taste or any known sick exposures. He states that he has had some low-grade fevers. States he has been taking ibuprofen and DayQuil at home. Denies any significant chest pain or shortness of breath. The history is provided by the patient. REVIEW OF SYSTEMS     Review of Systems   Constitutional: Negative for chills and fever. HENT: Positive for congestion, sinus pressure and sore throat. Respiratory: Positive for cough. Negative for shortness of breath. Cardiovascular: Negative for chest pain. Gastrointestinal: Negative for diarrhea, nausea and vomiting. Musculoskeletal: Negative for arthralgias and myalgias. Skin: Negative for rash. Allergic/Immunologic: Negative for environmental allergies. Neurological: Positive for headaches. PAST MEDICAL HISTORY         Diagnosis Date    Abnormal liver function tests     Congenital absence of one kidney     GERD (gastroesophageal reflux disease)     Hypertension     Hypertension, essential, benign        SURGICALHISTORY     Patient  has a past surgical history that includes hernia repair (6 months old); liver biopsy (2012); cyst removal (Right, 6 months old); [place] tooth extraction; Cholecystectomy (07/11/2016); Cardiac catheterization (07/06/2016); and Stomach surgery.     CURRENT MEDICATIONS       Previous Medications    CALCIUM CITRATE 250 MG TABS    Take 500 mg by mouth 2 times daily     CETIRIZINE (ZYRTEC ALLERGY) 10 MG TABLET    Take 1 tablet by mouth daily distress. Appearance: He is well-developed. He is not diaphoretic. HENT:      Right Ear: Tympanic membrane is bulging. Tympanic membrane is not erythematous. Left Ear: Tympanic membrane is bulging. Tympanic membrane is not erythematous. Mouth/Throat:      Mouth: Mucous membranes are moist.      Pharynx: Oropharynx is clear. Eyes:      Conjunctiva/sclera:      Right eye: Right conjunctiva is not injected. Left eye: Left conjunctiva is not injected. Pupils: Pupils are equal.   Neck:      Musculoskeletal: Normal range of motion. Cardiovascular:      Rate and Rhythm: Normal rate and regular rhythm. Heart sounds: No murmur. Pulmonary:      Effort: Pulmonary effort is normal. No respiratory distress. Breath sounds: Normal breath sounds. Musculoskeletal:      Right knee: He exhibits normal range of motion. Left knee: He exhibits normal range of motion. Lymphadenopathy:      Head:      Right side of head: No tonsillar adenopathy. Left side of head: No tonsillar adenopathy. Cervical: No cervical adenopathy. Skin:     General: Skin is warm. Findings: No rash. Neurological:      Mental Status: He is alert and oriented to person, place, and time. Psychiatric:         Behavior: Behavior normal.         DIAGNOSTIC RESULTS     Labs:No results found for this visit on 11/21/20. IMAGING:    No orders to display         EKG:  none    URGENT CARE COURSE:     Vitals:    11/21/20 1844   BP: 132/78   Pulse: 78   Resp: 16   Temp: 99.8 °F (37.7 °C)   TempSrc: Oral   SpO2: 98%       Medications - No data to display         PROCEDURES:  None    FINAL IMPRESSION      1. Acute maxillary sinusitis, recurrence not specified    2. Person under investigation for COVID-19          DISPOSITION/ PLAN     Discussed with the patient that exam is consistent with a viral illness and that I believe testing for coronavirus is warranted at this time.   Test was completed during visit today and patient is advised to quarantine until notified of results. Patient is advised to follow-up as directed by the health department if coronavirus would be detected. Advised to rest and hydrate and use over-the-counter antipyretic medications as needed for fever or body aches. Physical exam does demonstrate some sinus tenderness and possible sinusitis. I discussed with the patient the plan to treat with oral antibiotics and an albuterol inhaler for this. Patient is advised to present to the ER for any worsening symptoms and is agreeable to plan as discussed.        PATIENT REFERRED TO:  AGUSTIN Gordon CNP  [facility] / [facility] [address]      DISCHARGE MEDICATIONS:  New Prescriptions    No medications on file       Discontinued Medications    No medications on file       Current Discharge Medication List          AGUSTIN Meehan CNP    (Please note that portions of this note were completed with a voice recognition program. Efforts were made to edit the dictations but occasionally words are mis-transcribed.)          AGUSTIN Meehan CNP  11/21/20 1910

## 2020-11-23 ENCOUNTER — CARE COORDINATION (OUTPATIENT)
Dept: CARE COORDINATION | Age: 40
End: 2020-11-23

## 2020-11-23 LAB
PERFORMING LAB: ABNORMAL
REPORT: ABNORMAL
SARS-COV-2: DETECTED

## 2020-11-23 NOTE — CARE COORDINATION
Patient contacted regarding recent visit for viral symptoms. This Wilton Coombs contacted the patient by telephone to perform post discharge call. Verified name and  with patient as identifiers. Provided introduction to self, and reason for call due to viral symptoms of infection and/or exposure to COVID-19. Call within 2 business days of discharge: Yes       Patient presented to emergency department/flu clinic with complaints of viral symptoms/exposure to COVID. Patient reports symptoms are improving. Due to no new or worsening symptoms the RN CTN/ACM was not notified for escalation. Discussed exposure protocols and quarantine with CDC Guidelines What To Do If You Are Sick    Patient was given an opportunity for questions and concerns. Stay home except to get medical care    Separate yourself from other people and animals in your home    Call ahead before visiting your doctor    Wear a facemask    Cover your coughs and sneezes    Clean your hands often    Avoid sharing personal household items    Clean all high-touch surfaces everyday    Monitor your symptoms  Seek prompt medical attention if your illness is worsening (e.g., difficulty breathing). Before seeking care, call your healthcare provider and tell them that you have, or are being evaluated for, COVID-19. Put on a facemask before you enter the facility. These steps will help the healthcare provider's office to keep other people in the office or waiting room from getting infected or exposed. Ask your healthcare provider to call the local or [facility] health department. Persons who are placed under If you have a medical emergency and need to call 911, notify the dispatch personnel that you have, or are being evaluated for COVID-19. If possible, put on a facemask before emergency medical services arrive.     The patient agrees to contact the [facility] exposure line [phone], local health department [place] Department of Health: ([phone]) and PCP office for questions related to their healthcare. Author provided contact information for future reference. Patient/family/caregiver given information for [facility] and agrees to enroll yes  Patient's preferred e-mail: [email or web address]  Patient's preferred phone number: [phone]  Based on Loop alert triggers, patient will be contacted by nurse care manager for worsening symptoms. I spoke with the patient re: ed visit. Patient was seen and treated for cough and nasal congestion. Patient states he is feeling slightly better since visit. Denies fever or SOB. Taking medication as prescribed. Encouraged rest, fluids and Tylenol. COVID test results pending. I advised the patient to call the community call center at [phone] for COVID 19-like symptoms for further evalaution and instructions. After hours, please call [address] [facility] [facility] COVID-19 hotline number  [phone]. I advised patient to contact PCP office if needed. No further needs at this time. Patient agreed to LOOP.

## 2020-11-24 ENCOUNTER — CARE COORDINATION (OUTPATIENT)
Dept: CARE COORDINATION | Age: 40
End: 2020-11-24

## 2020-11-24 NOTE — CARE COORDINATION
Date/Time:  2020 9:02 AM    Patient contacted regarding remote symptom monitoring program alert or comment received. Verified patients name and  as identifiers. Discussed COVID-19 related testing which was available at this time. Test results were positive. Patient informed of results, if available? Yes    RN contacted the patient by telephone regarding yellow alert in Loop remote symptom monitoring program.    Patient reported the following symptoms: pain or aching joints, no new symptoms, no worsening symptoms and sore throat, stuffy nose. Due to continued symptoms, patient was advised to self-monitor symptoms at home. Due to no new or worsening symptoms encounter was not routed to provider for escalation. Education provided regarding infection prevention, and signs and symptoms of COVID-19 and when to seek medical attention with patient who verbalized understanding. Discussed exposure protocols and quarantine from [address] you at higher risk for severe illness  and given an opportunity for questions and concerns. The patient agrees to contact the [facility] exposure line [phone], [facility] department [place] Department of Health: ([phone]) and PCP office for questions related to their healthcare. From CDC: Are you at higher risk for severe illness?  Wash your hands often.  Avoid close contact (6 feet, which is about two arm lengths) with people who are sick.  Put distance between yourself and other people if COVID-19 is spreading in your community.  Clean and disinfect frequently touched surfaces.  Avoid all cruise travel and non-essential air travel.  Call your healthcare professional if you have concerns about COVID-19 and your underlying condition or if you are sick. For more information on steps you can take to protect yourself, see CDC's How to Protect Yourself      Patient will continue to self report symptoms using Loop self monitoring.   Patient has RN's contact information. Patient states he feels a little better. He still has some body aches, a stuffy nose, and a sore throat. He hasn't had a fever since Saturday or Sunday. He is taking motrin for the body aches and sore throat. He is also taking his prescribed doxycycline and albuterol inhaler as needed. Encouraged rest and drinking plenty of fluids to stay hydrated and help soothe his throat. Also suggested using a cool mist humidifier or vaporizer to help with any dryness at night. Encouraged follow up with his PCP and continued daily check ins on the Loop.

## 2021-02-18 DIAGNOSIS — Z98.84 STATUS POST LAPAROSCOPIC SLEEVE GASTRECTOMY: ICD-10-CM

## 2021-02-18 DIAGNOSIS — K21.9 GASTROESOPHAGEAL REFLUX DISEASE WITHOUT ESOPHAGITIS: ICD-10-CM

## 2021-02-18 DIAGNOSIS — Z13.21 MALNUTRITION SCREEN: ICD-10-CM

## 2021-02-18 DIAGNOSIS — E66.3 OVERWEIGHT (BMI 25.0-29.9): Primary | ICD-10-CM

## 2021-02-18 DIAGNOSIS — Z98.84 S/P LAPAROSCOPIC SLEEVE GASTRECTOMY: ICD-10-CM

## 2021-02-18 DIAGNOSIS — K91.2 POSTOPERATIVE INTESTINAL MALABSORPTION: ICD-10-CM

## 2021-02-18 RX ORDER — OMEPRAZOLE 40 MG/1
CAPSULE, DELAYED RELEASE ORAL
Qty: 30 CAPSULE | Refills: 3 | Status: SHIPPED | OUTPATIENT
Start: 2021-02-18 | End: 2022-06-13 | Stop reason: SDUPTHER

## 2021-02-26 LAB
A/G RATIO: 1.5 (ref 1.5–2.5)
ABSOLUTE BASO #: 0 /CMM (ref 0–200)
ABSOLUTE EOS #: 100 /CMM (ref 0–500)
ABSOLUTE LYMPH #: 2300 /CMM (ref 1000–4800)
ABSOLUTE MONO #: 400 /CMM (ref 0–800)
ABSOLUTE NEUT #: 3200 /CMM (ref 1800–7700)
ALBUMIN SERPL-MCNC: 4.8 GM/DL (ref 3.5–5)
ALP BLD-CCNC: 53 IU/L (ref 41–137)
ALT SERPL-CCNC: 36 IU/L (ref 10–40)
ANION GAP SERPL CALCULATED.3IONS-SCNC: 7 MMOL/L (ref 4–12)
AST SERPL-CCNC: 35 IU/L (ref 15–41)
BASOPHILS RELATIVE PERCENT: 0.6 % (ref 0–2)
BILIRUB SERPL-MCNC: 0.8 MG/DL (ref 0.2–1)
BUN BLDV-MCNC: 19 MG/DL (ref 7–20)
CALCIUM SERPL-MCNC: 10 MG/DL (ref 8.8–10.5)
CHLORIDE BLD-SCNC: 102 MEQ/L (ref 101–111)
CHOLESTEROL/HDL RELATIVE RISK: 2.2 (ref 4–5)
CHOLESTEROL: 188 MG/DL
CO2: 32 MEQ/L (ref 21–32)
CREAT SERPL-MCNC: 0.87 MG/DL (ref 0.6–1.3)
CREATININE CLEARANCE: >60
DIRECT-LDL / HDL RISK: 1.1
EOSINOPHILS RELATIVE PERCENT: 2.1 % (ref 0–6)
ESTIMATED AVERAGE GLUCOSE: 108 MG/DL
FERRITIN: 74 NG/ML (ref 20–250)
FOLATE: > 24.8 NG/ML
GLUCOSE: 91 MG/DL (ref 70–110)
HBA1C MFR BLD: 5.4 % (ref 4.4–6.4)
HCT VFR BLD CALC: 47.3 % (ref 40–49)
HDLC SERPL-MCNC: 83 MG/DL
HEMOGLOBIN: 15.9 GM/DL (ref 13.5–16.5)
IRON SATURATION: 38 % (ref 20–50)
IRON, SERUM: 145 MCG/DL (ref 45–182)
LDL CHOLESTEROL DIRECT: 99 MG/DL
LYMPHOCYTES RELATIVE PERCENT: 37.9 % (ref 15–45)
MCH RBC QN AUTO: 31.8 PG (ref 27.5–33)
MCHC RBC AUTO-ENTMCNC: 33.6 GM/DL (ref 33–36)
MCV RBC AUTO: 94.7 CU MIC (ref 80–97)
MONOCYTES RELATIVE PERCENT: 6.9 % (ref 2–10)
NEUTROPHILS RELATIVE PERCENT: 52.5 % (ref 40–70)
NUCLEATED RBCS: 0.1 /100 WBC
PARATHYROID HORMONE INTACT: 40.1 U/ML (ref 12–88)
PDW BLD-RTO: 12.7 % (ref 12–16)
PLATELET # BLD: 243 TH/CMM (ref 150–400)
POTASSIUM SERPL-SCNC: 4.1 MEQ/L (ref 3.6–5)
PREALBUMIN: 31 MG/DL (ref 16–38)
RBC # BLD: 5 MIL/CMM (ref 4.5–6)
SODIUM BLD-SCNC: 141 MEQ/L (ref 135–145)
TOTAL PROTEIN: 8 G/DL (ref 6.2–8)
TRANSFERRIN: 267 MG/DL (ref 180–329)
TRIGL SERPL-MCNC: 75 MG/DL
TSH REFLEX: 2.28 MCIU/ML (ref 0.49–4.67)
VITAMIN B-12: 603 PG/ML (ref 180–914)
VITAMIN D 25-HYDROXY: 77.1 NG/ML (ref 30–100)
VLDLC SERPL CALC-MCNC: 15 MG/DL
WBC # BLD: 6.2 TH/CMM (ref 4.4–10.5)

## 2021-03-03 LAB — THIAMINE BLOOD: 215 NMOL/L (ref 70–180)

## 2021-03-11 ENCOUNTER — INITIAL CONSULT (OUTPATIENT)
Dept: BARIATRICS/WEIGHT MGMT | Age: 41
End: 2021-03-11
Payer: COMMERCIAL

## 2021-03-11 VITALS
WEIGHT: 201 LBS | BODY MASS INDEX: 29.77 KG/M2 | RESPIRATION RATE: 18 BRPM | DIASTOLIC BLOOD PRESSURE: 82 MMHG | HEART RATE: 88 BPM | HEIGHT: 69 IN | SYSTOLIC BLOOD PRESSURE: 128 MMHG | TEMPERATURE: 97 F

## 2021-03-11 DIAGNOSIS — K21.9 GASTROESOPHAGEAL REFLUX DISEASE WITHOUT ESOPHAGITIS: ICD-10-CM

## 2021-03-11 DIAGNOSIS — E66.3 OVERWEIGHT (BMI 25.0-29.9): Primary | ICD-10-CM

## 2021-03-11 DIAGNOSIS — Z98.84 STATUS POST LAPAROSCOPIC SLEEVE GASTRECTOMY: ICD-10-CM

## 2021-03-11 PROCEDURE — 99213 OFFICE O/P EST LOW 20 MIN: CPT | Performed by: PHYSICIAN ASSISTANT

## 2021-03-11 PROCEDURE — G8484 FLU IMMUNIZE NO ADMIN: HCPCS | Performed by: PHYSICIAN ASSISTANT

## 2021-03-11 PROCEDURE — 1036F TOBACCO NON-USER: CPT | Performed by: PHYSICIAN ASSISTANT

## 2021-03-11 PROCEDURE — G8427 DOCREV CUR MEDS BY ELIG CLIN: HCPCS | Performed by: PHYSICIAN ASSISTANT

## 2021-03-11 PROCEDURE — G8419 CALC BMI OUT NRM PARAM NOF/U: HCPCS | Performed by: PHYSICIAN ASSISTANT

## 2021-03-11 NOTE — PROGRESS NOTES
[facility] PROFESSIONAL SERVS  [facility] WEIGHT MGNT CENTER  [address] , [address]  [address] [address]  Dept: [phone]  Loc: [phone]      Visit Date:  3/11/2021  Weight Management Postop Follow-up    HPI:      Venice Ledesma is a 37 y.o. male who is here today for almost 4 year follow up since  robotic-assisted sleeve gastrectomy performed by Dr. Georg Gilford  on 6/26/17. Has not been seen since June 2019. Doing really well. Feeling good. Weight today 201#. Reports that his weight has been stable for the last 6-8 months, around 200#. Up 18# since last visit. Down 59# since surgery. BMI 29 . Recently got back to drinking one protein shake daily. Getting in plenty of protein. Drinking plenty of fluids. Staying hydrated. Drinking 3-6 cups of coffee daily- advised to cut back to one cup daily. No carbonation. No sweets. Tolerating post-op diet. No problems with bowel movements. No nausea. No emesis. GERD well controlled with Omeprazole daily. Denies CP/SOB. No Dizziness. No abdominal pain. Taking and tolerating all vitamins- [id number] daily and Calcium. Annual labs reviewed. Seca scale completed and reviewed. Physical Activity: Nothing outside of work. Job is very physical.     Current BMI: Body mass index is 29.68 kg/m².   Current Weight:   Wt Readings from Last 3 Encounters:   03/11/21 201 lb (91.2 kg)   08/15/20 195 lb (88.5 kg)   02/21/20 192 lb (87.1 kg)     Pre-op Body Weight: 260  Initial: 278    Past Medical History:  Past Medical History:   Diagnosis Date    Abnormal liver function tests     Congenital absence of one kidney     GERD (gastroesophageal reflux disease)     Hypertension     Hypertension, essential, benign        Past Surgical History:  Past Surgical History:   Procedure Laterality Date    CARDIAC CATHETERIZATION  07/06/2016    CHOLECYSTECTOMY  07/11/2016    HIXENBAUGH    CYST REMOVAL Right 6 months old    testical-non cancerous    HERNIA REPAIR  6 months old inguinal    LIVER BIOPSY  2012    STOMACH SURGERY      gastric sleeve    WISDOM TOOTH EXTRACTION         Past Social History:  Social History     Socioeconomic History    Marital status:      Spouse name: Not on file    Number of children: Not on file    Years of education: Not on file    Highest education level: Not on file   Occupational History    Not on file   Social Needs    Financial resource strain: Not on file    Food insecurity     Worry: Not on file     Inability: Not on file    Transportation needs     Medical: Not on file     Non-medical: Not on file   Tobacco Use    Smoking status: Former Smoker     Quit date: 2009     Years since quittin.1    Smokeless tobacco: Former User     Quit date: 2012   Substance and Sexual Activity    Alcohol use: No     Alcohol/week: 0.0 standard drinks    Drug use: No    Sexual activity: Not on file   Lifestyle    Physical activity     Days per week: Not on file     Minutes per session: Not on file    Stress: Not on file   Relationships    Social connections     Talks on phone: Not on file     Gets together: Not on file     Attends Confucianism service: Not on file     Active member of club or organization: Not on file     Attends meetings of clubs or organizations: Not on file     Relationship status: Not on file    Intimate partner violence     Fear of current or ex partner: Not on file     Emotionally abused: Not on file     Physically abused: Not on file     Forced sexual activity: Not on file   Other Topics Concern    Not on file   Social History Narrative    Not on file        Medications:   Current Outpatient Medications   Medication Sig Dispense Refill    Multiple Vitamins-Minerals (CELEBRATE MULTI-COMPLETE 45) CHEW Take 1 tablet by mouth daily      omeprazole (PRILOSEC) 40 MG delayed release capsule 1tab Qday 30 capsule 3    Calcium Citrate 250 MG TABS Take 500 mg by mouth 2 times daily       ketoconazole (NIZORAL) 2 % 0.0 01/07/2019    BASO 0.3 01/07/2019    NRBC 0.1 02/26/2021    NRBC 0 01/07/2019    SEGSABS 4.1 01/07/2019    LYMPHSABS 2300 02/26/2021    LYMPHSABS 1.5 01/07/2019    MONOSABS 400 02/26/2021    MONOSABS 0.8 01/07/2019    EOSABS 100 02/26/2021    EOSABS 0.0 01/07/2019    BASOSABS 0 02/26/2021    BASOSABS 0.04 05/15/2019        BMP/CMP   Lab Results   Component Value Date    GLUCOSE 91 02/26/2021    CREATININE 0.87 02/26/2021    BUN 19 02/26/2021     02/26/2021    K 4.1 02/26/2021     02/26/2021    CO2 32 02/26/2021    CALCIUM 10.00 02/26/2021    AST 35 02/26/2021    ALKPHOS 53 02/26/2021    PROT 8.0 02/26/2021    LABALBU 4.8 02/26/2021    BILITOT 0.8 02/26/2021    ALT 36 02/26/2021        PREALBUMIN   Lab Results   Component Value Date    PREALBUMIN 31.0 02/26/2021        VITAMIN B12   Lab Results   Component Value Date    CTCPWTUH76 603 02/26/2021        24 HOUR URINE CALCIUM   No results found for: [place]irt[place], [place], CALCIUMUR     VITAMIN D   Lab Results   Component Value Date    VITD25 77.10 02/26/2021        VITAMIN B1/ THIAMINE   Lab Results   Component Value Date    [id number] 178 05/15/2019        RBC FOLATE   Lab Results   Component Value Date    FOLATE > 24.8 02/26/2021        LIPID SCREEN (FASTING)   Lab Results   Component Value Date    CHOL 188 02/26/2021    CHOL 157 03/07/2017    TRIG 75 02/26/2021    HDL 83 02/26/2021    LDLCALC 89 03/07/2017   ,     HGA1C (T2DM ONLY)   Lab Results   Component Value Date    LABA1C 5.4 02/26/2021    AVGG 87 12/23/2017        TSH   Lab Results   Component Value Date    TSH 2.59 05/15/2019        IRON   Lab Results   Component Value Date    IRON 71 05/15/2019        IONIZED CALCIUM   No results found for: LORE MCKEON      Assessment:       Diagnosis Orders   1. Overweight (BMI 25.0-29.9)     2. Status post laparoscopic sleeve gastrectomy     3. Gastroesophageal reflux disease without esophagitis       Plan:    Stay well hydrated.  Drink a minimum of 64 oz of non-carbonated, non-caffeinated fluids daily. Nutritional education occurred during visit. Tolerating diet. Continue following with dietitian and follow their recommendations as directed. Continue  60-80 grams of protein each day. Signs and symptoms reviewed with patient that would be concerning and need her to return to office for re-evaluation. Patient will call if any questions or concerns arrise. Importance of physical activity discussed with patient. Increase physical activity as tolerated  Add strength training  Continue taking Multivitamin, Calcium   Encouraged to attend support groups  4 year labs reviewed with patient today  SECA scale completed and reviewed with patient today  Measurements completed and reviewed with patient today  Cut down to 1 cup of coffee daily  Return in about 6 months (around 9/11/2021) for postop follow up. I spent 20 minutes in direct patient care with greater than 50% spent in counseling and coordination of care.      Electronically signed by JOJO Clark on 3/11/2021 at 3:55 PM

## 2021-03-11 NOTE — PATIENT INSTRUCTIONS
Stay well hydrated. Drink a minimum of 64 oz of non-carbonated, non-caffeinated fluids daily. Nutritional education occurred during visit. Tolerating diet. Continue following with dietitian and follow their recommendations as directed. Continue  60-80 grams of protein each day. Signs and symptoms reviewed with patient that would be concerning and need her to return to office for re-evaluation. Patient will call if any questions or concerns arrise. Importance of physical activity discussed with patient.    Increase physical activity as tolerated  Add strength training  Continue taking Multivitamin, Calcium   Encouraged to attend support groups  4 year labs reviewed with patient today  SECA scale completed and reviewed with patient today  Measurements completed and reviewed with patient today  Cut down to 1 cup of coffee daily

## 2021-04-05 ENCOUNTER — APPOINTMENT (OUTPATIENT)
Dept: GENERAL RADIOLOGY | Age: 41
End: 2021-04-05
Payer: COMMERCIAL

## 2021-04-05 ENCOUNTER — HOSPITAL ENCOUNTER (EMERGENCY)
Age: 41
Discharge: HOME | End: 2021-04-05
Payer: COMMERCIAL

## 2021-04-05 VITALS
SYSTOLIC BLOOD PRESSURE: 142 MMHG | BODY MASS INDEX: 29.62 KG/M2 | WEIGHT: 200 LBS | DIASTOLIC BLOOD PRESSURE: 73 MMHG | RESPIRATION RATE: 16 BRPM | HEART RATE: 68 BPM | TEMPERATURE: 97.7 F | OXYGEN SATURATION: 98 % | HEIGHT: 69 IN

## 2021-04-05 DIAGNOSIS — S62.626A CLOSED DISPLACED FRACTURE OF MIDDLE PHALANX OF RIGHT LITTLE FINGER, INITIAL ENCOUNTER: Primary | ICD-10-CM

## 2021-04-05 PROCEDURE — 73140 X-RAY EXAM OF FINGER(S): CPT

## 2021-04-05 PROCEDURE — 99214 OFFICE O/P EST MOD 30 MIN: CPT | Performed by: NURSE PRACTITIONER

## 2021-04-05 PROCEDURE — 99213 OFFICE O/P EST LOW 20 MIN: CPT

## 2021-04-05 ASSESSMENT — ENCOUNTER SYMPTOMS
NAUSEA: 0
SHORTNESS OF BREATH: 0
VOMITING: 0

## 2021-04-05 ASSESSMENT — PAIN DESCRIPTION - LOCATION: LOCATION: FINGER (COMMENT WHICH ONE)

## 2021-04-05 ASSESSMENT — PAIN DESCRIPTION - PAIN TYPE: TYPE: ACUTE PAIN

## 2021-04-05 ASSESSMENT — PAIN DESCRIPTION - DESCRIPTORS: DESCRIPTORS: ACHING

## 2021-04-05 NOTE — ED NOTES
Pt discharged. Pt verbalized understanding of discharge instructions. Pt walked out per self in stable condition.      Marisabel Snow LPN  30/61/17 [id number]

## 2021-04-05 NOTE — ED NOTES
Pt complains of right finger injury. States he noticed the swelling and pain on yesterday, but doesn't know how it happened. States he may have done it somehow while working on the [employer].      Mell Dupont RN  04/05/21 [id number]

## 2021-04-05 NOTE — ED NOTES
4th and 5th fingers was ben taped on right hand.      Cr Nguyen LPN  88/18/89 [id number] Alert-The patient is alert, awake and responds to voice. The patient is oriented to time, place, and person. The triage nurse is able to obtain subjective information.

## 2021-12-06 DIAGNOSIS — K91.2 POSTOPERATIVE INTESTINAL MALABSORPTION: ICD-10-CM

## 2021-12-06 DIAGNOSIS — Z13.21 MALNUTRITION SCREEN: ICD-10-CM

## 2021-12-06 DIAGNOSIS — E66.3 OVERWEIGHT (BMI 25.0-29.9): Primary | ICD-10-CM

## 2021-12-06 DIAGNOSIS — Z98.84 STATUS POST LAPAROSCOPIC SLEEVE GASTRECTOMY: ICD-10-CM

## 2021-12-14 ENCOUNTER — HOSPITAL ENCOUNTER (EMERGENCY)
Age: 41
Discharge: HOME | End: 2021-12-14
Attending: EMERGENCY MEDICINE
Payer: COMMERCIAL

## 2021-12-14 VITALS
TEMPERATURE: 97.2 F | OXYGEN SATURATION: 97 % | HEART RATE: 62 BPM | DIASTOLIC BLOOD PRESSURE: 99 MMHG | SYSTOLIC BLOOD PRESSURE: 162 MMHG | WEIGHT: 205 LBS | BODY MASS INDEX: 30.27 KG/M2 | RESPIRATION RATE: 16 BRPM

## 2021-12-14 DIAGNOSIS — R03.0 ELEVATED BLOOD PRESSURE READING: ICD-10-CM

## 2021-12-14 DIAGNOSIS — J01.01 ACUTE RECURRENT MAXILLARY SINUSITIS: Primary | ICD-10-CM

## 2021-12-14 PROCEDURE — 99213 OFFICE O/P EST LOW 20 MIN: CPT

## 2021-12-14 PROCEDURE — 99213 OFFICE O/P EST LOW 20 MIN: CPT | Performed by: EMERGENCY MEDICINE

## 2021-12-14 RX ORDER — ACETAMINOPHEN, GUAIFENESIN, AND PHENYLEPHRINE HYDROCHLORIDE 650; 400; 10 MG/20ML; MG/20ML; MG/20ML
SOLUTION ORAL
COMMUNITY
End: 2022-06-13

## 2021-12-14 RX ORDER — LORATADINE 10 MG
1 CAPSULE ORAL 4 TIMES DAILY PRN
Qty: 30 CAPSULE | Refills: 1 | Status: SHIPPED | OUTPATIENT
Start: 2021-12-14 | End: 2022-03-30 | Stop reason: ALTCHOICE

## 2021-12-14 RX ORDER — DOXYCYCLINE HYCLATE 100 MG
100 TABLET ORAL 2 TIMES DAILY
Qty: 14 TABLET | Refills: 0 | Status: SHIPPED | OUTPATIENT
Start: 2021-12-14 | End: 2021-12-21

## 2021-12-14 ASSESSMENT — ENCOUNTER SYMPTOMS
EYE PAIN: 0
BACK PAIN: 0
TROUBLE SWALLOWING: 0
SINUS PRESSURE: 1
NAUSEA: 0
EYE DISCHARGE: 0
SINUS PAIN: 1
WHEEZING: 0
RHINORRHEA: 1
VOICE CHANGE: 0
SHORTNESS OF BREATH: 0
ROS SKIN COMMENTS: NO RASH OR BRUISE
EYE REDNESS: 0
STRIDOR: 0
ABDOMINAL PAIN: 0
SORE THROAT: 0
COUGH: 0
VOMITING: 0
DIARRHEA: 0

## 2021-12-15 NOTE — ED PROVIDER NOTES
[id number]       Chief Complaint   Patient presents with    Sinusitis     runny/stuffy nose    Cough     dry       Nurses Notes reviewed and I agree except as noted in the HPI. HISTORY OF PRESENT ILLNESS   Mitzi Hoang is a 37 y.o. male who presents with 2-week history of maxillary sinus pain and pressure, congestion, postnasal drainage, dry cough, fatigue. Patient states symptoms are identical to previous sinus infection. Over-the-counter medications not improving symptoms. History of hypertension, no diabetes or asthma. Quit smoking  REVIEW OF SYSTEMS     Review of Systems   Constitutional: Negative. Negative for appetite change, chills, fatigue, fever and unexpected weight change. Decreased appetite, fatigue no fever   HENT: Positive for congestion, postnasal drip, rhinorrhea, sinus pressure and sinus pain. Negative for ear discharge, ear pain, sneezing, sore throat, trouble swallowing and voice change. Congestion, sinus pressure, postnasal drip   Eyes: Negative for pain, discharge and redness. Respiratory: Negative for cough, shortness of breath, wheezing and stridor. Cough no shortness of breath   Cardiovascular: Negative for chest pain and leg swelling. No chest pain   Gastrointestinal: Negative for abdominal pain, diarrhea, nausea and vomiting. No abdominal pain or vomiting   Genitourinary: Negative for dysuria, frequency, hematuria and urgency. Musculoskeletal: Negative for arthralgias, back pain, myalgias and neck pain. Skin: Negative for rash. No rash or bruise   Neurological: Negative for dizziness, syncope, weakness and headaches. Headache no syncope   Hematological: Negative for adenopathy. Psychiatric/Behavioral: Negative for behavioral problems, confusion, sleep disturbance and suicidal ideas. The patient is not nervous/anxious.     red and bold elements reviewed    PAST MEDICAL HISTORY         Diagnosis Date    Abnormal liver function tests     Congenital absence of one kidney     GERD (gastroesophageal reflux disease)     Hypertension     Hypertension, essential, benign        SURGICAL HISTORY     Patient  has a past surgical history that includes hernia repair (6 months old); liver biopsy (2012); cyst removal (Right, 6 months old); [place] tooth extraction; Cholecystectomy (07/11/2016); Cardiac catheterization (07/06/2016); and Stomach surgery. CURRENT MEDICATIONS       Discharge Medication List as of 12/14/2021  7:34 PM      CONTINUE these medications which have NOT CHANGED    Details   phenylephrine-APAP-guaiFENesin ([place] [address] FAST-MAX) -400 MG/20ML LIQD Take by mouthHistorical Med      Multiple Vitamins-Minerals (CELEBRATE MULTI-COMPLETE 45) CHEW Take 1 tablet by mouth dailyHistorical Med      omeprazole (PRILOSEC) 40 MG delayed release capsule 1tab Qday, Disp-30 capsule, R-3Normal      Calcium Citrate 250 MG TABS Take 500 mg by mouth 2 times daily Historical Med      ketoconazole (NIZORAL) 2 % shampoo Apply topically daily as needed for Itching Apply topically daily as needed., Topical, DAILY PRN, Until Discontinued, Historical Med      cetirizine (ZYRTEC ALLERGY) 10 MG tablet Take 1 tablet by mouth daily, Disp-30 tablet, R-0Normal             ALLERGIES     Patient is is allergic to ciprofloxacin and other. FAMILY HISTORY     Patient'sfamily history includes Asthma in his brother; Cancer in his maternal grandfather, maternal grandmother, and maternal uncle; Cancer (age of onset: [de-identified]) in his paternal grandfather; Diabetes in his maternal grandfather; Heart Attack in his father and paternal grandfather; Hypertension in his father and mother; Obesity in his brother, maternal grandmother, mother, and paternal grandfather. SOCIAL HISTORY     Patient  reports that he quit smoking about 12 years ago. He quit smokeless tobacco use about 9 years ago. He reports current alcohol use. He reports that he does not use drugs. PHYSICAL EXAM     ED TRIAGE VITALS  BP: (!) 162/99, Temp: 97.2 °F (36.2 °C), Pulse: 62, Resp: 16, SpO2: 97 %  Physical Exam  Vitals and nursing note reviewed. Constitutional:       General: He is not in acute distress. Appearance: He is well-developed. He is not ill-appearing. Comments: Nasal voice, moist membranes, normal airway   HENT:      Head: Normocephalic and atraumatic. Right Ear: Tympanic membrane and external ear normal.      Left Ear: Tympanic membrane and external ear normal.      Nose: Congestion and rhinorrhea present. Right Sinus: Maxillary sinus tenderness and frontal sinus tenderness present. Left Sinus: Maxillary sinus tenderness and frontal sinus tenderness present. Mouth/Throat:      Pharynx: No oropharyngeal exudate. Comments: Oropharynx erythematous no exudate or abscess  Eyes:      General: No scleral icterus. Right eye: No discharge. Left eye: No discharge. Extraocular Movements:      Right eye: Normal extraocular motion. Left eye: Normal extraocular motion. Conjunctiva/sclera: Conjunctivae normal.      Pupils: Pupils are equal, round, and reactive to light. Comments: Conjunctiva clear   Neck:      Thyroid: No thyromegaly. Vascular: No JVD. Comments: No meningismus  Cardiovascular:      Rate and Rhythm: Normal rate and regular rhythm. Pulses: Normal pulses. Heart sounds: Normal heart sounds, S1 normal and S2 normal. No murmur heard. No friction rub. No gallop. Comments: No murmur  Pulmonary:      Effort: Pulmonary effort is normal. No tachypnea or respiratory distress. Breath sounds: Normal breath sounds. No stridor. No decreased breath sounds, wheezing, rhonchi or rales. Comments: No cough lungs clear  Chest:      Chest wall: No tenderness.    Abdominal:      General: Bowel sounds are normal. There is no distension. Palpations: Abdomen is soft. There is no mass. Tenderness: There is no abdominal tenderness. There is no guarding or rebound. Comments: Soft nontender   Musculoskeletal:         General: No tenderness. Normal range of motion. Cervical back: Normal range of motion. No spinous process tenderness or muscular tenderness. Comments: Joints extremity normal   Lymphadenopathy:      Cervical: Cervical adenopathy present. Right cervical: Superficial cervical adenopathy present. No deep cervical adenopathy. Left cervical: Superficial cervical adenopathy present. No deep cervical adenopathy. Skin:     General: Skin is warm and dry. Findings: No erythema or rash. Comments: No rash or bruising   Neurological:      Mental Status: He is alert and oriented to person, place, and time. Cranial Nerves: No cranial nerve deficit. Motor: No abnormal muscle tone. Coordination: Coordination normal.      Deep Tendon Reflexes: Reflexes are normal and symmetric. Reflexes normal.      Comments: Appropriate, no focal   Psychiatric:         Behavior: Behavior normal.         Thought Content: Thought content normal.         Judgment: Judgment normal.         DIAGNOSTIC RESULTS   Labs: No results found for this visit on 12/14/21. IMAGING:  No orders to display     URGENT CARE COURSE:     Vitals:    12/14/21 1907   BP: (!) 162/99   Pulse: 62   Resp: 16   Temp: 97.2 °F (36.2 °C)   TempSrc: Temporal   SpO2: 97%   Weight: 205 lb (93 kg)       Medications - No data to display  PROCEDURES:  None  FINALIMPRESSION      1. Acute recurrent maxillary sinusitis    2. Elevated blood pressure reading        DISPOSITION/PLAN   DISPOSITION Decision To Discharge 12/14/2021 07:28:48 PM  Nontoxic, well-hydrated, normal airway. No airway abscess or epiglottitis, sepsis, CNS infection, pneumonia, hypoxia, bronchospasm. Patient has acute sinusitis.   Will treat with doxycycline, Coricidin HBP, Tylenol, increased oral clear liquids, vaporizer, rest.  Patient to follow-up with PCP in 6 days if problems persist and he understands to go to ED if worse.   PATIENT REFERRED TO:  Zina Hodgkin, APRN - CNP [address]  [address]  [phone]    Schedule an appointment as soon as possible for a visit in 6 days  Recheck if problems persist, go to emergency if worse    DISCHARGE MEDICATIONS:  Discharge Medication List as of 12/14/2021  7:34 PM      START taking these medications    Details   doxycycline hyclate (VIBRA-TABS) 100 MG tablet Take 1 tablet by mouth 2 times daily for 7 days, Disp-14 tablet, R-0Print      Dextromethorphan-guaiFENesin (CORICIDIN HBP CONGESTION/COUGH)  MG CAPS Take 1 tablet by mouth 4 times daily as needed (Sinus pain and pressure, congestion, postnasal drainage, ear pain and pressure, cough), Disp-30 capsule, R-1Print           Discharge Medication List as of 12/14/2021  7:34 PM          MD Ramona Beaulieu MD  12/14/21 1940

## 2021-12-15 NOTE — ED TRIAGE NOTES
Patient states 2 wks, head congestion, sore throat, dry cough. Sinus pressure. Mucinex, no OTC pain med taken.

## 2022-03-30 ENCOUNTER — HOSPITAL ENCOUNTER (EMERGENCY)
Age: 42
Discharge: HOME | End: 2022-03-30
Attending: EMERGENCY MEDICINE
Payer: COMMERCIAL

## 2022-03-30 VITALS
HEART RATE: 67 BPM | OXYGEN SATURATION: 98 % | BODY MASS INDEX: 30.27 KG/M2 | RESPIRATION RATE: 18 BRPM | SYSTOLIC BLOOD PRESSURE: 173 MMHG | TEMPERATURE: 98.3 F | DIASTOLIC BLOOD PRESSURE: 82 MMHG | WEIGHT: 205 LBS

## 2022-03-30 DIAGNOSIS — R03.0 ELEVATED BLOOD PRESSURE READING: ICD-10-CM

## 2022-03-30 DIAGNOSIS — J01.01 ACUTE RECURRENT MAXILLARY SINUSITIS: Primary | ICD-10-CM

## 2022-03-30 PROCEDURE — 99213 OFFICE O/P EST LOW 20 MIN: CPT

## 2022-03-30 PROCEDURE — 99213 OFFICE O/P EST LOW 20 MIN: CPT | Performed by: EMERGENCY MEDICINE

## 2022-03-30 RX ORDER — DOXYCYCLINE HYCLATE 100 MG
100 TABLET ORAL 2 TIMES DAILY
Qty: 14 TABLET | Refills: 0 | Status: SHIPPED | OUTPATIENT
Start: 2022-03-30 | End: 2022-04-06

## 2022-03-30 RX ORDER — LORATADINE 10 MG
1 CAPSULE ORAL 4 TIMES DAILY PRN
Qty: 30 CAPSULE | Refills: 1 | Status: SHIPPED | OUTPATIENT
Start: 2022-03-30 | End: 2022-06-13

## 2022-03-30 ASSESSMENT — ENCOUNTER SYMPTOMS
VOICE CHANGE: 1
SORE THROAT: 0
RHINORRHEA: 1
DIARRHEA: 0
VOMITING: 0
EYE PAIN: 0
STRIDOR: 0
SHORTNESS OF BREATH: 0
COUGH: 0
SINUS PRESSURE: 1
EYE DISCHARGE: 0
SINUS PAIN: 1
EYE REDNESS: 0
ABDOMINAL PAIN: 0
TROUBLE SWALLOWING: 0
NAUSEA: 0
BACK PAIN: 0
WHEEZING: 0
ROS SKIN COMMENTS: NO RASH OR BRUISE

## 2022-03-30 NOTE — ED PROVIDER NOTES
confusion, sleep disturbance and suicidal ideas. The patient is not nervous/anxious. All other systems reviewed and are negative. red and bold elements reviewed    PAST MEDICAL HISTORY         Diagnosis Date    Abnormal liver function tests     Congenital absence of one kidney     GERD (gastroesophageal reflux disease)     Hypertension     Hypertension, essential, benign        SURGICAL HISTORY     Patient  has a past surgical history that includes hernia repair (6 months old); liver biopsy (2012); cyst removal (Right, 6 months old); [place] tooth extraction; Cholecystectomy (07/11/2016); Cardiac catheterization (07/06/2016); and Stomach surgery. CURRENT MEDICATIONS       Discharge Medication List as of 3/30/2022 12:56 PM      CONTINUE these medications which have NOT CHANGED    Details   phenylephrine-APAP-guaiFENesin ([place] [address] FAST-MAX) -400 MG/20ML LIQD Take by mouthHistorical Med      Multiple Vitamins-Minerals (CELEBRATE MULTI-COMPLETE 45) CHEW Take 1 tablet by mouth dailyHistorical Med      omeprazole (PRILOSEC) 40 MG delayed release capsule 1tab Qday, Disp-30 capsule, R-3Normal      Calcium Citrate 250 MG TABS Take 500 mg by mouth 2 times daily Historical Med      ketoconazole (NIZORAL) 2 % shampoo Apply topically daily as needed for Itching Apply topically daily as needed., Topical, DAILY PRN, Until Discontinued, Historical Med      cetirizine (ZYRTEC ALLERGY) 10 MG tablet Take 1 tablet by mouth daily, Disp-30 tablet, R-0Normal             ALLERGIES     Patient is is allergic to ciprofloxacin and other. FAMILY HISTORY     Patient'sfamily history includes Asthma in his brother; Cancer in his maternal grandfather, maternal grandmother, and maternal uncle;  Cancer (age of onset: [de-identified]) in his paternal grandfather; Diabetes in his maternal grandfather; Heart Attack in his father and paternal grandfather; Hypertension in his father and mother; Obesity in his brother, maternal grandmother, mother, and paternal grandfather. SOCIAL HISTORY     Patient  reports that he quit smoking about 13 years ago. He quit smokeless tobacco use about 9 years ago. He reports current alcohol use. He reports that he does not use drugs. PHYSICAL EXAM     ED TRIAGE VITALS  BP: (!) 173/82, Temp: 98.3 °F (36.8 °C), Pulse: 67, Resp: 18, SpO2: 98 %  Physical Exam  Vitals and nursing note reviewed. Constitutional:       General: He is not in acute distress. Appearance: He is well-developed. He is not ill-appearing. Comments: Nasal voice, moist membranes   HENT:      Head: Normocephalic and atraumatic. Right Ear: Tympanic membrane and external ear normal.      Left Ear: Tympanic membrane and external ear normal.      Nose: Congestion and rhinorrhea present. Right Sinus: Maxillary sinus tenderness and frontal sinus tenderness present. Left Sinus: Maxillary sinus tenderness and frontal sinus tenderness present. Mouth/Throat:      Pharynx: No oropharyngeal exudate. Comments: Oropharynx nl  Eyes:      General: No scleral icterus. Right eye: No discharge. Left eye: No discharge. Extraocular Movements:      Right eye: Normal extraocular motion. Left eye: Normal extraocular motion. Conjunctiva/sclera: Conjunctivae normal.      Pupils: Pupils are equal, round, and reactive to light. Comments: Conjunctiva clear   Neck:      Thyroid: No thyromegaly. Vascular: No JVD. Comments: No meningismus  Cardiovascular:      Rate and Rhythm: Normal rate and regular rhythm. Pulses: Normal pulses. Heart sounds: Normal heart sounds, S1 normal and S2 normal. No murmur heard. No friction rub. No gallop. Comments: No murmur  Pulmonary:      Effort: Pulmonary effort is normal. No tachypnea or respiratory distress. Breath sounds: Normal breath sounds. No stridor. No wheezing, rhonchi or rales.       Comments: Dry cough lungs clear  Chest:      Chest wall: No tenderness. Abdominal:      General: Bowel sounds are normal. There is no distension. Palpations: Abdomen is soft. There is no mass. Tenderness: There is no abdominal tenderness. There is no right CVA tenderness, left CVA tenderness, guarding or rebound. Comments: Soft nontender   Musculoskeletal:         General: No tenderness. Normal range of motion. Cervical back: Normal range of motion. Comments: Extremities normal   Lymphadenopathy:      Cervical: Cervical adenopathy present. Right cervical: Superficial cervical adenopathy present. No deep cervical adenopathy. Left cervical: Superficial cervical adenopathy present. No deep cervical adenopathy. Skin:     General: Skin is warm and dry. Findings: No erythema or rash. Comments: No rash or bruising   Neurological:      Mental Status: He is alert and oriented to person, place, and time. Cranial Nerves: No cranial nerve deficit. Motor: No abnormal muscle tone. Coordination: Coordination normal.      Deep Tendon Reflexes: Reflexes are normal and symmetric. Reflexes normal.      Comments: Appropriate, nonfocal    Psychiatric:         Behavior: Behavior normal.         Thought Content: Thought content normal.         Judgment: Judgment normal.         DIAGNOSTIC RESULTS   Labs: No results found for this visit on 03/30/22. IMAGING:  No orders to display     URGENT CARE COURSE:     Vitals:    03/30/22 1242 03/30/22 1244   BP: (!) 185/82 (!) 173/82   Pulse: 67    Resp: 18    Temp: 98.3 °F (36.8 °C)    TempSrc: Temporal    SpO2: 98%    Weight: 205 lb (93 kg)        Medications - No data to display  PROCEDURES:  None  FINALIMPRESSION      1. Acute recurrent maxillary sinusitis    2. Elevated blood pressure reading        DISPOSITION/PLAN   DISPOSITION Decision To Discharge 03/30/2022 12:52:16 PM  Nontoxic, well-hydrated, normal airway.   No airway abscess or epiglottitis, sepsis, CNS infection, pneumonia, hypoxia, bronchospasm. No ACS, CHF, PE. Patient has acute sinusitis.  Will treat with doxycycline, Coricidin HBP, Zyrtec, Tylenol, increased oral clear liquids, vaporizer, rest.  Patient to follow-up with PCP in 5 days if problems persist, and patient understands to go to ED if worse  PATIENT REFERRED TO:  Crystal Peralta, APRN - CNP  [address] [place] [address]  [phone]    Schedule an appointment as soon as possible for a visit in 5 days  Recheck if problems persist, go to emergency if worse    DISCHARGE MEDICATIONS:  Discharge Medication List as of 3/30/2022 12:56 PM      START taking these medications    Details   doxycycline hyclate (VIBRA-TABS) 100 MG tablet Take 1 tablet by mouth 2 times daily for 7 days, Disp-14 tablet, R-0Print      Dextromethorphan-guaiFENesin (CORICIDIN HBP CONGESTION/COUGH)  MG CAPS Take 1 tablet by mouth 4 times daily as needed (Sinus pain and sinus pressure, congestion, postnasal drainage, ear pain and ear pressure, cough), Disp-30 capsule, R-1Print           Discharge Medication List as of 3/30/2022 12:56 PM          MD Lorraine Magallon MD  03/30/22 4268

## 2022-03-30 NOTE — Clinical Note
Aj Rivas was seen and treated in our emergency department on 3/30/2022. He may return to work on 04/01/2022. No work March 30 and March 31, 2022     If you have any questions or concerns, please don't hesitate to call.       Ethel Shane MD

## 2022-04-19 ENCOUNTER — HOSPITAL ENCOUNTER (OUTPATIENT)
Age: 42
Setting detail: SPECIMEN
Discharge: HOME | End: 2022-04-19

## 2022-04-19 DIAGNOSIS — Z13.21 MALNUTRITION SCREEN: ICD-10-CM

## 2022-04-19 DIAGNOSIS — Z98.84 STATUS POST LAPAROSCOPIC SLEEVE GASTRECTOMY: ICD-10-CM

## 2022-04-19 DIAGNOSIS — E66.3 OVERWEIGHT (BMI 25.0-29.9): ICD-10-CM

## 2022-04-19 DIAGNOSIS — K91.2 POSTOPERATIVE INTESTINAL MALABSORPTION: ICD-10-CM

## 2022-04-19 LAB
ABSOLUTE EOS #: 0.14 K/UL (ref 0–0.44)
ABSOLUTE IMMATURE GRANULOCYTE: <0.03 K/UL (ref 0–0.3)
ABSOLUTE LYMPH #: 1.78 K/UL (ref 1.1–3.7)
ABSOLUTE MONO #: 0.34 K/UL (ref 0.1–1.2)
ALBUMIN SERPL-MCNC: 4.6 G/DL (ref 3.5–5.2)
ALBUMIN/GLOBULIN RATIO: 1.5 (ref 1–2.5)
ALP BLD-CCNC: 52 U/L (ref 40–129)
ALT SERPL-CCNC: 47 U/L (ref 5–41)
ANION GAP SERPL CALCULATED.3IONS-SCNC: 14 MMOL/L (ref 9–17)
AST SERPL-CCNC: 44 U/L
BASOPHILS # BLD: 1 % (ref 0–2)
BASOPHILS ABSOLUTE: 0.03 K/UL (ref 0–0.2)
BILIRUB SERPL-MCNC: 0.53 MG/DL (ref 0.3–1.2)
BUN BLDV-MCNC: 16 MG/DL (ref 6–20)
CALCIUM SERPL-MCNC: 9.7 MG/DL (ref 8.6–10.4)
CHLORIDE BLD-SCNC: 104 MMOL/L (ref 98–107)
CHOLESTEROL/HDL RATIO: 2.3
CHOLESTEROL: 185 MG/DL
CO2: 26 MMOL/L (ref 20–31)
CREAT SERPL-MCNC: 0.86 MG/DL (ref 0.7–1.2)
EOSINOPHILS RELATIVE PERCENT: 3 % (ref 1–4)
FERRITIN: 207 NG/ML (ref 30–400)
FOLATE: 17.5 NG/ML
GFR AFRICAN AMERICAN: >60 ML/MIN
GFR NON-AFRICAN AMERICAN: >60 ML/MIN
GFR SERPL CREATININE-BSD FRML MDRD: ABNORMAL ML/MIN/{1.73_M2}
GLUCOSE BLD-MCNC: 80 MG/DL (ref 70–99)
HCT VFR BLD CALC: 45.6 % (ref 40.7–50.3)
HDLC SERPL-MCNC: 82 MG/DL
HEMOGLOBIN: 15 G/DL (ref 13–17)
IMMATURE GRANULOCYTES: 0 %
IRON: 104 UG/DL (ref 59–158)
LDL CHOLESTEROL: 87 MG/DL (ref 0–130)
LYMPHOCYTES # BLD: 41 % (ref 24–43)
MCH RBC QN AUTO: 32.3 PG (ref 25.2–33.5)
MCHC RBC AUTO-ENTMCNC: 32.9 G/DL (ref 28.4–34.8)
MCV RBC AUTO: 98.1 FL (ref 82.6–102.9)
MONOCYTES # BLD: 8 % (ref 3–12)
NRBC AUTOMATED: 0 PER 100 WBC
PDW BLD-RTO: 11.9 % (ref 11.8–14.4)
PLATELET # BLD: 218 K/UL (ref 138–453)
PMV BLD AUTO: 10.8 FL (ref 8.1–13.5)
POTASSIUM SERPL-SCNC: 4.5 MMOL/L (ref 3.7–5.3)
PREALBUMIN: 26.1 MG/DL (ref 20–40)
PTH INTACT: 22.49 PG/ML (ref 15–65)
RBC # BLD: 4.65 M/UL (ref 4.21–5.77)
SEG NEUTROPHILS: 47 % (ref 36–65)
SEGMENTED NEUTROPHILS ABSOLUTE COUNT: 2.06 K/UL (ref 1.5–8.1)
SODIUM BLD-SCNC: 144 MMOL/L (ref 135–144)
TOTAL PROTEIN: 7.6 G/DL (ref 6.4–8.3)
TRIGL SERPL-MCNC: 79 MG/DL
TSH SERPL DL<=0.05 MIU/L-ACNC: 2.62 UIU/ML (ref 0.3–5)
VITAMIN B-12: 466 PG/ML (ref 232–1245)
VITAMIN D 25-HYDROXY: 53.4 NG/ML
WBC # BLD: 4.4 K/UL (ref 3.5–11.3)

## 2022-04-20 LAB
ESTIMATED AVERAGE GLUCOSE: 100 MG/DL
HBA1C MFR BLD: 5.1 % (ref 4–6)

## 2022-04-25 LAB — VITAMIN B1 WHOLE BLOOD: 121 NMOL/L (ref 70–180)

## 2022-06-13 ENCOUNTER — OFFICE VISIT (OUTPATIENT)
Dept: BARIATRICS/WEIGHT MGMT | Age: 42
End: 2022-06-13
Payer: COMMERCIAL

## 2022-06-13 VITALS
HEIGHT: 69 IN | SYSTOLIC BLOOD PRESSURE: 142 MMHG | WEIGHT: 215 LBS | HEART RATE: 72 BPM | TEMPERATURE: 97.9 F | DIASTOLIC BLOOD PRESSURE: 76 MMHG | BODY MASS INDEX: 31.84 KG/M2

## 2022-06-13 DIAGNOSIS — Z13.21 MALNUTRITION SCREEN: ICD-10-CM

## 2022-06-13 DIAGNOSIS — K21.9 GASTROESOPHAGEAL REFLUX DISEASE WITHOUT ESOPHAGITIS: ICD-10-CM

## 2022-06-13 DIAGNOSIS — K91.2 POSTOPERATIVE INTESTINAL MALABSORPTION: ICD-10-CM

## 2022-06-13 DIAGNOSIS — Z98.84 S/P LAPAROSCOPIC SLEEVE GASTRECTOMY: Primary | ICD-10-CM

## 2022-06-13 PROCEDURE — 99213 OFFICE O/P EST LOW 20 MIN: CPT | Performed by: PHYSICIAN ASSISTANT

## 2022-06-13 PROCEDURE — G8417 CALC BMI ABV UP PARAM F/U: HCPCS | Performed by: PHYSICIAN ASSISTANT

## 2022-06-13 PROCEDURE — 1036F TOBACCO NON-USER: CPT | Performed by: PHYSICIAN ASSISTANT

## 2022-06-13 PROCEDURE — G8427 DOCREV CUR MEDS BY ELIG CLIN: HCPCS | Performed by: PHYSICIAN ASSISTANT

## 2022-06-13 RX ORDER — MONTELUKAST SODIUM 10 MG/1
TABLET ORAL
COMMUNITY
Start: 2022-06-02

## 2022-06-13 RX ORDER — OMEPRAZOLE 40 MG/1
CAPSULE, DELAYED RELEASE ORAL
Qty: 90 CAPSULE | Refills: 2 | Status: SHIPPED | OUTPATIENT
Start: 2022-06-13

## 2022-06-13 RX ORDER — FAMOTIDINE 40 MG/1
TABLET, FILM COATED ORAL
COMMUNITY
Start: 2022-03-30

## 2022-06-13 NOTE — PROGRESS NOTES
[facility] PROFESSIONAL SERVS  [facility]  [address] , [address]  [address] [address]  Dept: [phone]  Loc: [phone]      Visit Date:  6/13/2022  Weight Management Postop Follow-up    HPI:      Jelly Ramsey is a 40 y.o. male who is here today for 5 year follow up since  robotic-assisted sleeve gastrectomy performed by Dr. Mirella Renner  on 6/26/17. Weight today 215#. Up 14# since last visit. Down 45# since surgery. BMI 31 . Staying hydrated. Continues to drink 3-6 cups of coffee daily. Once again advised to decrease to 1 cup daily. Reports that he is getting in plenty of protein through food intake. Continues to eat 3 meals daily, focusing on protein. Occasional protein bars. Admits that he is not always making the best decisions when it comes to nutrition and notes that he has recently gotten back to following the dietitian recommendations. Admits that he was drinking whiskey daily -1/2 pint for several months. Stopped about a month ago after lab work showed increased liver enzymes. Advised to avoid alcohol. Planning to repeat liver enzymes with PCP. Tolerating post-op diet. No problems with bowel movements. No nausea. No emesis. Heartburn/regurgitation well controlled with Omeprazole and Famotidine- following with Dr. Mejia Cook. Denies CP/SOB. No Dizziness. No abdominal pain. Taking and tolerating all vitamins- [id number]/ Calcium. Annual labs reviewed. Seca scale completed and reviewed. Long discussion on importance of lifestyle changes, making better decisions with nutrition and increasing dedicated activity to be successful lifelong with weight loss with or without bariatric surgery. Physical Activity: Job is very physical. No activity outside of work. Current BMI: Body mass index is 31.75 kg/m².   Current Weight:   Wt Readings from Last 3 Encounters:   06/13/22 215 lb (97.5 kg)   03/30/22 205 lb (93 kg)   12/14/21 205 lb (93 kg)     Pre-op Body Weight: 260  Initial: 46    Past Medical History:  Past Medical History:   Diagnosis Date    Abnormal liver function tests     Congenital absence of one kidney     GERD (gastroesophageal reflux disease)     Hypertension     Hypertension, essential, benign        Past Surgical History:  Past Surgical History:   Procedure Laterality Date    CARDIAC CATHETERIZATION  2016    CHOLECYSTECTOMY  2016    HIXENBAUGH    CYST REMOVAL Right 6 months old    testical-non cancerous    HERNIA REPAIR  6 months old    inguinal    LIVER BIOPSY      STOMACH SURGERY      gastric sleeve    WISDOM TOOTH EXTRACTION         Past Social History:  Social History     Socioeconomic History    Marital status:      Spouse name: Not on file    Number of children: Not on file    Years of education: Not on file    Highest education level: Not on file   Occupational History    Not on file   Tobacco Use    Smoking status: Former Smoker     Quit date: 2009     Years since quittin.4    Smokeless tobacco: Former User     Quit date: 2012   Vaping Use    Vaping Use: Never used   Substance and Sexual Activity    Alcohol use: Yes     Alcohol/week: 0.0 standard drinks     Comment: occas.  Drug use: No    Sexual activity: Not on file   Other Topics Concern    Not on file   Social History Narrative    Not on file     Social Determinants of Health     Financial Resource Strain:     Difficulty of Paying Living Expenses: Not on file   Food Insecurity:     Worried About Running Out of Food in the Last Year: Not on file    José Miguel of Food in the Last Year: Not on file   Transportation Needs:     Lack of Transportation (Medical): Not on file    Lack of Transportation (Non-Medical):  Not on file   Physical Activity:     Days of Exercise per Week: Not on file    Minutes of Exercise per Session: Not on file   Stress:     Feeling of Stress : Not on file   Social Connections:     Frequency of Communication with Friends and Family: Not on file    Frequency of Social Gatherings with Friends and Family: Not on file    Attends Restorationist Services: Not on file    Active Member of Clubs or Organizations: Not on file    Attends Club or Organization Meetings: Not on file    Marital Status: Not on file   Intimate Partner Violence:     Fear of Current or Ex-Partner: Not on file    Emotionally Abused: Not on file    Physically Abused: Not on file    Sexually Abused: Not on file   Housing Stability:     Unable to Pay for Housing in the Last Year: Not on file    Number of [place] in the Last Year: Not on file    Unstable Housing in the Last Year: Not on file        Medications:   Current Outpatient Medications   Medication Sig Dispense Refill    omeprazole (PRILOSEC) 40 MG delayed release capsule Take one capsule daily in the morning 30-60 minutes before breakfast 90 capsule 2    Multiple Vitamins-Minerals (CELEBRATE MULTI-COMPLETE 45) CHEW Take 1 tablet by mouth daily      Calcium Citrate 250 MG TABS Take 500 mg by mouth 2 times daily       cetirizine (ZYRTEC ALLERGY) 10 MG tablet Take 1 tablet by mouth daily 30 tablet 0    famotidine (PEPCID) 40 MG tablet TAKE 1 TABLET BY MOUTH IN THE EVENING      montelukast (SINGULAIR) 10 MG tablet TAKE 1 TABLET BY MOUTH AT BEDTIME       No current facility-administered medications for this visit. Allergies: Allergies   Allergen Reactions    Ciprofloxacin Hives    Other Dermatitis     \"tide\" laundry soap       Subjective:    Constitutional: Denies any fever, chills, fatigue. Wound: Denies any rash, skin color changes or wound problems. Resp: Denies any cough, shortness of breath. CV: Denies any chest pain, orthopnea or syncope. MS: Denies myalgias, arthralgias. GI: Denies any nausea, vomiting, diarrhea, constipation, melena, hematochezia. No incisional discomfort.(+) reflux  : Denies any hematuria, hesitancy or dysuria. NEURO: Denies seizures, headache.       Objective: BP (!) 142/76 (Site: Right Upper Arm, Position: Sitting, Cuff Size: Large Adult)   Pulse 72   Temp 97.9 °F (36.6 °C) (Infrared)   Ht 5' 9\" (1.753 m)   Wt 215 lb (97.5 kg)   BMI 31.75 kg/m²   Physical Examination:   Constitutional: Alert and oriented to person, place and time. Well-developed, well- nourished. Head: Normocephalic and atraumatic  Neck: Supple. Eyes: EOMI b/l. Conjunctivae normal.  No scleral icterus. Respiratory: Effort normal. No respiratory distress. Abd: Benign  Ext:  Movement x 4. No edema  Skin; Warm and dry, no visible rashes, lesions or ulcers.    Neuro: Cranial Nerves Grossly Intact; nml coordination        Labs:  CBC   Lab Results   Component Value Date    WBC 4.4 04/19/2022    RBC 4.65 04/19/2022    HGB 15.0 04/19/2022    HCT 45.6 04/19/2022    MCV 98.1 04/19/2022    MCH 32.3 04/19/2022    MCHC 32.9 04/19/2022    RDW 11.9 04/19/2022     04/19/2022    MPV 10.8 04/19/2022    RBCMORP NOT REPORTED 05/15/2019    SEGSPCT 63.5 01/07/2019    LABLYMP 23.4 01/07/2019    MONOPCT 8 04/19/2022    MONOPCT 6.9 02/26/2021    LABEOS 0.0 01/07/2019    BASO 0.3 01/07/2019    NRBC 0.1 02/26/2021    NRBC 0 01/07/2019    SEGSABS 4.1 01/07/2019    LYMPHSABS 1.78 04/19/2022    LYMPHSABS 1.5 01/07/2019    MONOSABS 0.34 04/19/2022    MONOSABS 0.8 01/07/2019    EOSABS 0.14 04/19/2022    EOSABS 0.0 01/07/2019    BASOSABS 0.03 04/19/2022        BMP/CMP   Lab Results   Component Value Date    GLUCOSE 80 04/19/2022    GLUCOSE 91 02/26/2021    CREATININE 0.86 04/19/2022    BUN 16 04/19/2022     04/19/2022    K 4.5 04/19/2022     04/19/2022    CO2 26 04/19/2022    CALCIUM 9.7 04/19/2022    AST 44 04/19/2022    ALKPHOS 52 04/19/2022    PROT 7.6 04/19/2022    LABALBU 4.6 04/19/2022    BILITOT 0.53 04/19/2022    ALT 47 04/19/2022        PREALBUMIN   Lab Results   Component Value Date    PREALBUMIN 26.1 04/19/2022        VITAMIN B12   Lab Results   Component Value Date    NVRKFTTI16 466 04/19/2022 24 HOUR URINE CALCIUM   No results found for: Lugene Parrish, CALCIUMUR     VITAMIN D   Lab Results   Component Value Date    VITD25 53.4 04/19/2022        VITAMIN B1/ THIAMINE   Lab Results   Component Value Date    QZAF0TCOLIQ 121 04/19/2022        RBC FOLATE   Lab Results   Component Value Date    FOLATE 17.5 04/19/2022        LIPID SCREEN (FASTING)   Lab Results   Component Value Date    CHOL 185 04/19/2022    CHOL 157 03/07/2017    TRIG 79 04/19/2022    HDL 82 04/19/2022    LDLCALC 89 03/07/2017   ,     HGA1C (T2DM ONLY)   Lab Results   Component Value Date    LABA1C 5.1 04/19/2022    AVGG 87 12/23/2017        TSH   Lab Results   Component Value Date    TSH 2.62 04/19/2022        IRON   Lab Results   Component Value Date    IRON 104 04/19/2022        IONIZED CALCIUM   No results found for: LORE MCKEON      Assessment:       Diagnosis Orders   1. S/P laparoscopic sleeve gastrectomy  CBC with Auto Differential    Comprehensive Metabolic Panel    Ferritin    Hemoglobin A1C    Iron    Iron Binding Capacity    Lipid Panel    Prealbumin    PTH, Intact    TSH with Reflex    Vitamin B1    Vitamin B12 & Folate    Vitamin D 25 Hydroxy   2. BMI 31.0-31.9,adult  CBC with Auto Differential    Comprehensive Metabolic Panel    Ferritin    Hemoglobin A1C    Iron    Iron Binding Capacity    Lipid Panel    Prealbumin    PTH, Intact    TSH with Reflex    Vitamin B1    Vitamin B12 & Folate    Vitamin D 25 Hydroxy   3. Gastroesophageal reflux disease without esophagitis  omeprazole (PRILOSEC) 40 MG delayed release capsule   4. Postoperative intestinal malabsorption  CBC with Auto Differential    Comprehensive Metabolic Panel    Ferritin    Hemoglobin A1C    Iron    Iron Binding Capacity    Lipid Panel    Prealbumin    PTH, Intact    TSH with Reflex    Vitamin B1    Vitamin B12 & Folate    Vitamin D 25 Hydroxy   5.  Malnutrition screen  CBC with Auto Differential    Comprehensive Metabolic Panel    Ferritin    Hemoglobin A1C    Iron    Iron Binding Capacity    Lipid Panel    Prealbumin    PTH, Intact    TSH with Reflex    Vitamin B1    Vitamin B12 & Folate    Vitamin D 25 Hydroxy     Plan:    Stay well hydrated. Drink a minimum of 64 oz of non-carbonated, non-caffeinated fluids daily. Nutritional education occurred during visit. Tolerating diet. Continue following with dietitian and follow their recommendations as directed. Continue  60-80 grams of protein each day. Signs and symptoms reviewed with patient that would be concerning and need her to return to office for re-evaluation. Patient will call if any questions or concerns arrise. Importance of physical activity discussed with patient. Increase physical activity as tolerated  Add strength training  Continue taking Multivitamin and Calcium   Continue Omeprazole/Pepcid- following with Dr. Garret Cervantes  Encouraged to attend support groups  Annual labs reviewed with patient today- plans to repeat liver enzymes with PCP  SECA scale completed and reviewed with patient today  Measurements completed and reviewed with patient today  Avoid alcohol  Cut down to 1 cup of coffee daily ( Norris Valentin)  Will continue annual follow up per patient request  Offered more frequent apts with weight management/dieitian/ Dr Peter Smith- declines today. Advised to call office if he would like to proceed with more frequent follow up. Return in about 1 year (around 6/13/2023) for postop follow up. I spent 20 minutes in direct patient care with greater than 50% spent in counseling and coordination of care.      Electronically signed by JOJO Gaytan on 6/13/2022 at 10:12 AM

## 2022-06-13 NOTE — PATIENT INSTRUCTIONS
Stay well hydrated. Drink a minimum of 64 oz of non-carbonated, non-caffeinated fluids daily. Nutritional education occurred during visit. Tolerating diet. Continue following with dietitian and follow their recommendations as directed. Continue  60-80 grams of protein each day. Signs and symptoms reviewed with patient that would be concerning and need her to return to office for re-evaluation. Patient will call if any questions or concerns arrise. Importance of physical activity discussed with patient.    Increase physical activity as tolerated  Add strength training  Continue taking Multivitamin and Calcium   Continue Omeprazole/Pepcid- following with Dr. Lavell Samayoa  Encouraged to attend support groups  Annual labs reviewed with patient today- plans to repeat liver enzymes with PCP  SECA scale completed and reviewed with patient today  Measurements completed and reviewed with patient today  Avoid alcohol  Cut down to 1 cup of coffee daily ( Carson Bedolla)  Will continue annual follow up per patient request  Offered more frequent apts with weight management/dieitian/ Dr Ashlyn Field

## 2022-12-24 ENCOUNTER — APPOINTMENT (OUTPATIENT)
Dept: CT IMAGING | Age: 42
End: 2022-12-24
Payer: COMMERCIAL

## 2022-12-24 ENCOUNTER — APPOINTMENT (OUTPATIENT)
Dept: GENERAL RADIOLOGY | Age: 42
End: 2022-12-24
Payer: COMMERCIAL

## 2022-12-24 ENCOUNTER — HOSPITAL ENCOUNTER (EMERGENCY)
Age: 42
Discharge: HOME | End: 2022-12-24
Payer: COMMERCIAL

## 2022-12-24 VITALS
HEIGHT: 69 IN | RESPIRATION RATE: 18 BRPM | HEART RATE: 55 BPM | WEIGHT: 220 LBS | DIASTOLIC BLOOD PRESSURE: 95 MMHG | OXYGEN SATURATION: 100 % | TEMPERATURE: 98.5 F | BODY MASS INDEX: 32.58 KG/M2 | SYSTOLIC BLOOD PRESSURE: 165 MMHG

## 2022-12-24 DIAGNOSIS — S22.000A THORACIC COMPRESSION FRACTURE, CLOSED, INITIAL ENCOUNTER (HCC): Primary | ICD-10-CM

## 2022-12-24 PROCEDURE — 72072 X-RAY EXAM THORAC SPINE 3VWS: CPT

## 2022-12-24 PROCEDURE — 6360000002 HC RX W HCPCS: Performed by: PHYSICIAN ASSISTANT

## 2022-12-24 PROCEDURE — 99284 EMERGENCY DEPT VISIT MOD MDM: CPT

## 2022-12-24 PROCEDURE — 72128 CT CHEST SPINE W/O DYE: CPT

## 2022-12-24 PROCEDURE — 96372 THER/PROPH/DIAG INJ SC/IM: CPT

## 2022-12-24 PROCEDURE — 6370000000 HC RX 637 (ALT 250 FOR IP): Performed by: PHYSICIAN ASSISTANT

## 2022-12-24 RX ORDER — HYDROCODONE BITARTRATE AND ACETAMINOPHEN 5; 325 MG/1; MG/1
1 TABLET ORAL EVERY 6 HOURS PRN
Qty: 16 TABLET | Refills: 0 | Status: SHIPPED | OUTPATIENT
Start: 2022-12-24 | End: 2022-12-28

## 2022-12-24 RX ORDER — LIDOCAINE 4 G/G
2 PATCH TOPICAL ONCE
Status: DISCONTINUED | OUTPATIENT
Start: 2022-12-24 | End: 2022-12-24 | Stop reason: HOSPADM

## 2022-12-24 RX ORDER — CYCLOBENZAPRINE HCL 10 MG
10 TABLET ORAL 3 TIMES DAILY PRN
Qty: 15 TABLET | Refills: 0 | Status: SHIPPED | OUTPATIENT
Start: 2022-12-24 | End: 2023-01-03

## 2022-12-24 RX ORDER — KETOROLAC TROMETHAMINE 30 MG/ML
15 INJECTION, SOLUTION INTRAMUSCULAR; INTRAVENOUS ONCE
Status: COMPLETED | OUTPATIENT
Start: 2022-12-24 | End: 2022-12-24

## 2022-12-24 RX ORDER — LIDOCAINE 50 MG/G
1 PATCH TOPICAL DAILY
Qty: 15 PATCH | Refills: 0 | Status: SHIPPED | OUTPATIENT
Start: 2022-12-24

## 2022-12-24 RX ORDER — HYDROCODONE BITARTRATE AND ACETAMINOPHEN 5; 325 MG/1; MG/1
1 TABLET ORAL ONCE
Status: COMPLETED | OUTPATIENT
Start: 2022-12-24 | End: 2022-12-24

## 2022-12-24 RX ORDER — CYCLOBENZAPRINE HCL 10 MG
10 TABLET ORAL ONCE
Status: COMPLETED | OUTPATIENT
Start: 2022-12-24 | End: 2022-12-24

## 2022-12-24 RX ADMIN — KETOROLAC TROMETHAMINE 15 MG: 30 INJECTION, SOLUTION INTRAMUSCULAR; INTRAVENOUS at 02:09

## 2022-12-24 RX ADMIN — CYCLOBENZAPRINE 10 MG: 10 TABLET, FILM COATED ORAL at 04:47

## 2022-12-24 RX ADMIN — HYDROCODONE BITARTRATE AND ACETAMINOPHEN 1 TABLET: 5; 325 TABLET ORAL at 04:47

## 2022-12-24 ASSESSMENT — PAIN DESCRIPTION - DESCRIPTORS: DESCRIPTORS: ACHING

## 2022-12-24 ASSESSMENT — PAIN DESCRIPTION - ORIENTATION: ORIENTATION: UPPER

## 2022-12-24 ASSESSMENT — PAIN DESCRIPTION - LOCATION: LOCATION: BACK

## 2022-12-24 ASSESSMENT — PAIN SCALES - GENERAL
PAINLEVEL_OUTOF10: 4
PAINLEVEL_OUTOF10: 5

## 2022-12-24 ASSESSMENT — PAIN - FUNCTIONAL ASSESSMENT: PAIN_FUNCTIONAL_ASSESSMENT: 0-10

## 2022-12-24 ASSESSMENT — ENCOUNTER SYMPTOMS: BACK PAIN: 1

## 2022-12-24 ASSESSMENT — PAIN DESCRIPTION - FREQUENCY: FREQUENCY: CONTINUOUS

## 2022-12-24 ASSESSMENT — PAIN DESCRIPTION - PAIN TYPE: TYPE: ACUTE PAIN

## 2022-12-24 NOTE — ED TRIAGE NOTES
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  A&O; ambulatory, RR even and unlabored

## 2022-12-24 NOTE — ED NOTES
Per [employer], [facility] does not require initial injury drug screen/BAT.       Shirlene Porter  12/24/22 0203

## 2022-12-24 NOTE — ED NOTES
Patient given care and discharge instructions. Understood well. No questions at this time. A&O; RR even and unlabored.       Frida Elizondo RN  12/24/22 9748

## 2022-12-24 NOTE — ED PROVIDER NOTES
[facility]  eMERGENCY dEPARTMENT eNCOUnter          CHIEF COMPLAINT       Chief Complaint   Patient presents with    Back Injury       Nurses Notes reviewed and I agree except as noted inthe HPI. HISTORY OF PRESENT ILLNESS    Lisa Arana is a 40 y.o. male who presents to the Emergency Department for the evaluation of back injury. Patient states that about 1.5 to 2 hours ago, he was at work when he was trying to push a side plow forward so that he could unhook it. States it fell over, onto his shoulder and he heard a crunching sound with immediate onset of pain that is primarily between the shoulder blades bilaterally. It does not radiate to the chest or extremities. No associated numbness or tingling. No shortness of breath. No history of similar pain in the past.  He has not yet had anything for treatment of the pain. The HPI was provided by the patient. REVIEW OF SYSTEMS     Review of Systems   Musculoskeletal:  Positive for back pain. All other systems reviewed and are negative. PAST MEDICAL HISTORY    has a past medical history of Abnormal liver function tests, Congenital absence of one kidney, GERD (gastroesophageal reflux disease), Hypertension, and Hypertension, essential, benign. SURGICAL HISTORY      has a past surgical history that includes hernia repair (7 months old); liver biopsy (2012); cyst removal (Right, 6 months old); [place] tooth extraction; Cholecystectomy (07/11/2016); Cardiac catheterization (07/06/2016); and Stomach surgery.     CURRENT MEDICATIONS       Discharge Medication List as of 12/24/2022  4:40 AM        CONTINUE these medications which have NOT CHANGED    Details   famotidine (PEPCID) 40 MG tablet TAKE 1 TABLET BY MOUTH IN THE EVENINGHistorical Med      montelukast (SINGULAIR) 10 MG tablet TAKE 1 TABLET BY MOUTH AT BEDTIMEHistorical Med      omeprazole (PRILOSEC) 40 MG delayed release capsule Take one capsule daily in the morning 30-60 minutes before breakfast, Disp-90 capsule, R-2Normal      Multiple Vitamins-Minerals (CELEBRATE MULTI-COMPLETE 45) CHEW Take 1 tablet by mouth dailyHistorical Med      Calcium Citrate 250 MG TABS Take 500 mg by mouth 2 times daily Historical Med      cetirizine (ZYRTEC ALLERGY) 10 MG tablet Take 1 tablet by mouth daily, Disp-30 tablet, R-0Normal             ALLERGIES     is allergic to ciprofloxacin and other. FAMILY HISTORY     He indicated that his mother is alive. He indicated that his father is alive. He indicated that his brother is alive. He indicated that his maternal grandmother is . He indicated that his maternal grandfather is . He indicated that his paternal grandmother is alive. He indicated that his paternal grandfather is . He indicated that the status of his maternal uncle is unknown.   family history includes Asthma in his brother; Cancer in his maternal grandfather, maternal grandmother, and maternal uncle; Cancer (age of onset: [de-identified]) in his paternal grandfather; Diabetes in his maternal grandfather; Heart Attack in his father and paternal grandfather; Hypertension in his father and mother; Obesity in his brother, maternal grandmother, mother, and paternal grandfather. SOCIAL HISTORY      reports that he quit smoking about 13 years ago. He quit smokeless tobacco use about 10 years ago. He reports current alcohol use. He reports that he does not use drugs. PHYSICAL EXAM     INITIAL VITALS:  height is 5' 9\" (1.753 m) and weight is 220 lb (99.8 kg). His oral temperature is 98.5 °F (36.9 °C). His blood pressure is 165/95 (abnormal) and his pulse is 55. His respiration is 18 and oxygen saturation is 100%. Physical Exam  Vitals and nursing note reviewed. HENT:      Head: Normocephalic and atraumatic. Eyes:      Conjunctiva/sclera: Conjunctivae normal.   Cardiovascular:      Rate and Rhythm: Normal rate.    Pulmonary:      Effort: Pulmonary effort is normal. No respiratory distress. Chest:      Chest wall: No tenderness. Abdominal:      Palpations: Abdomen is soft. Tenderness: There is no abdominal tenderness. There is no right CVA tenderness or left CVA tenderness. Musculoskeletal:      Cervical back: Normal range of motion and neck supple. No tenderness. Thoracic back: Tenderness present. No bony tenderness. Lumbar back: No bony tenderness. Back:       Comments: Tenderness along the trapezius muscle bilaterally with point of most significant tenderness as noted in the figure above, just left of midline   Skin:     General: Skin is warm and dry. Neurological:      General: No focal deficit present. Mental Status: He is alert and oriented to person, place, and time. Psychiatric:         Mood and Affect: Mood normal.       DIFFERENTIAL DIAGNOSIS:   Differential diagnoses are discussed    DIAGNOSTIC RESULTS     EKG: All EKG's are interpreted by the Emergency Department Physician who either signs or Co-signsthis chart in the absence of a cardiologist.        RADIOLOGY: non-plain film images(s) such as CT, Ultrasound and MRI are read by the radiologist.    CT THORACIC SPINE WO CONTRAST   Final Result   Impression:   Mild acute compression fractures at T4 and T5. Minimal compression    fracture at T6 is favored over degenerative change. No retropulsion of    fracture fragments or involvement of the posterior elements. This document has been electronically signed by: Cindy Orantes. Fatimah Tapia MD    on 12/24/2022 04:08 AM      All CTs at this facility use dose modulation techniques and iterative    reconstructions, and/or weight-based dosing   when appropriate to reduce radiation to a low as reasonably achievable. XR THORACIC SPINE (3 VIEWS)   Final Result   Impression:   Question mild height loss at T4, degenerative or posttraumatic. This document has been electronically signed by: Cindy Tapia MD    on 12/24/2022 02:50 AM LABS:    Labs Reviewed - No data to display    EMERGENCY DEPARTMENT COURSE:   Vitals:    Vitals:    12/24/22 0154 12/24/22 0259   BP: (!) 178/98 (!) 165/95   Pulse: 76 55   Resp: 18    Temp: 98.5 °F (36.9 °C)    TempSrc: Oral    SpO2: 100% 100%   Weight: 220 lb (99.8 kg)    Height: 5' 9\" (1.753 m)       2:05 AM EST: The patient was seen and evaluated. Patient presents with hypertension and otherwise reassuring vital signs, neurovascularly intact complaining of back pain that occurred after a Workmen's Comp. injury shortly prior to arrival.  He is ambulatory independently. Complains of pain between the shoulder blades and does have some near midline pinpoint tenderness as noted in the figure above. He was treated with lidocaine patches and Toradol as he is driving. Reports this makes the pain tolerable but there is still visible grimace and discomfort when he ambulates. Initial x-ray question mild height loss at T4, degenerative versus posttraumatic. Follow-up CT shows mild acute compression fractures at T4 and T5 with minimal compression fracture at T6 favored over degenerative changes. No retropulsion of fracture fragments or involvement of the posterior elements. Results were discussed with the patient. We will give a take-home dose of Flexeril and Norco for home pain control as he is driving. Follow-up with spine advised as were activity restrictions. He was agreeable. Discussed with the on-call spine provider who is agreeable as well, can see the patient in the office Tuesday for follow-up. Return precautions were discussed with the patient who denied further needs upon discharge. He was discharged with prescriptions for Flexeril, Norco and lidocaine patches    CRITICAL CARE:   None    CONSULTS:  Dr. Los Anderson, orthospine    PROCEDURES:  None    FINAL IMPRESSION      1.  Thoracic compression fracture, closed, initial encounter [facility])          DISPOSITION/PLAN   Discharge    PATIENT REFERRED TO:  Tobias Valencia MD  [address]  [phone]    Call in 3 days      [address] [facility] [address] [address] EMERGENCY DEPT  [address]  [place] [address]  [phone]    If symptoms worsen    DISCHARGEMEDICATIONS:  Discharge Medication List as of 12/24/2022  4:40 AM        START taking these medications    Details   HYDROcodone-acetaminophen (NORCO) 5-325 MG per tablet Take 1 tablet by mouth every 6 hours as needed for Pain for up to 4 days. WARNING: This medication may make you drowsy. Do not operate heavy machinery or motor vehicles during use. Max Daily Amount: 4 tablets, Disp-16 tablet, R-0Normal      cyclobenzaprine (FLEXERIL) 10 MG tablet Take 1 tablet by mouth 3 times daily as needed for Muscle spasms . WARNING: Medication may make you drowsy/sleepy. Do not take before driving or operating heavy machinery. , Disp-15 tablet, R-0Normal      lidocaine (LIDODERM) 5 % Place 1 patch onto the skin daily 12 hours on, 12 hours off., Disp-15 patch, R-0Normal             (Please note that portions of this note were completedwith a voice recognition program.  Efforts were made to edit the dictations but occasionally words are mis-transcribed.)       Dory Jose PA-C  12/24/22 [id number]

## 2023-02-04 ENCOUNTER — NURSE ONLY (OUTPATIENT)
Dept: LAB | Age: 43
End: 2023-02-04

## 2023-02-04 LAB
25(OH)D3 SERPL-MCNC: 51 NG/ML (ref 30–100)
ALBUMIN SERPL BCG-MCNC: 4.5 G/DL (ref 3.5–5.1)
ALP SERPL-CCNC: 75 U/L (ref 38–126)
ALT SERPL W/O P-5'-P-CCNC: 84 U/L (ref 11–66)
ANION GAP SERPL CALC-SCNC: 9 MEQ/L (ref 8–16)
AST SERPL-CCNC: 72 U/L (ref 5–40)
BACTERIA: NORMAL
BASOPHILS ABSOLUTE: 0 THOU/MM3 (ref 0–0.1)
BASOPHILS NFR BLD AUTO: 1 %
BILIRUB SERPL-MCNC: 0.9 MG/DL (ref 0.3–1.2)
BILIRUB UR QL STRIP: NEGATIVE
BUN SERPL-MCNC: 18 MG/DL (ref 7–22)
CALCIUM SERPL-MCNC: 9.6 MG/DL (ref 8.5–10.5)
CASTS #/AREA URNS LPF: NORMAL /LPF
CASTS #/AREA URNS LPF: NORMAL /LPF
CHARACTER UR: CLEAR
CHARCOAL URNS QL MICRO: NORMAL
CHLORIDE SERPL-SCNC: 101 MEQ/L (ref 98–111)
CHOLEST SERPL-MCNC: 217 MG/DL (ref 100–199)
CO2 SERPL-SCNC: 29 MEQ/L (ref 23–33)
COLOR UR: NORMAL
CREAT SERPL-MCNC: 0.9 MG/DL (ref 0.4–1.2)
CRYSTALS URNS QL MICRO: NORMAL
DEPRECATED RDW RBC AUTO: 41.9 FL (ref 35–45)
EOSINOPHIL NFR BLD AUTO: 3.7 %
EOSINOPHILS ABSOLUTE: 0.2 THOU/MM3 (ref 0–0.4)
EPITHELIAL CELLS, UA: NORMAL /HPF
ERYTHROCYTE [DISTWIDTH] IN BLOOD BY AUTOMATED COUNT: 11.7 % (ref 11.5–14.5)
GFR SERPL CREATININE-BSD FRML MDRD: > 60 ML/MIN/1.73M2
GLUCOSE SERPL-MCNC: 93 MG/DL (ref 70–108)
GLUCOSE UR QL STRIP.AUTO: NEGATIVE MG/DL
HCT VFR BLD AUTO: 45.9 % (ref 42–52)
HDLC SERPL-MCNC: 82 MG/DL
HGB BLD-MCNC: 15.3 GM/DL (ref 14–18)
HGB UR QL STRIP.AUTO: NEGATIVE
IMM GRANULOCYTES # BLD AUTO: 0.01 THOU/MM3 (ref 0–0.07)
IMM GRANULOCYTES NFR BLD AUTO: 0.2 %
KETONES UR QL STRIP.AUTO: NEGATIVE
LDLC SERPL CALC-MCNC: 120 MG/DL
LEUKOCYTE ESTERASE UR QL STRIP.AUTO: NEGATIVE
LYMPHOCYTES ABSOLUTE: 1.8 THOU/MM3 (ref 1–4.8)
LYMPHOCYTES NFR BLD AUTO: 38.1 %
MCH RBC QN AUTO: 32.8 PG (ref 26–33)
MCHC RBC AUTO-ENTMCNC: 33.3 GM/DL (ref 32.2–35.5)
MCV RBC AUTO: 98.3 FL (ref 80–94)
MONOCYTES ABSOLUTE: 0.5 THOU/MM3 (ref 0.4–1.3)
MONOCYTES NFR BLD AUTO: 9.9 %
NEUTROPHILS NFR BLD AUTO: 47.1 %
NITRITE UR QL STRIP.AUTO: NEGATIVE
NRBC BLD AUTO-RTO: 0 /100 WBC
PH UR STRIP.AUTO: 6 [PH] (ref 5–9)
PLATELET # BLD AUTO: 226 THOU/MM3 (ref 130–400)
PMV BLD AUTO: 10.1 FL (ref 9.4–12.4)
POTASSIUM SERPL-SCNC: 4.5 MEQ/L (ref 3.5–5.2)
PROT SERPL-MCNC: 7.1 G/DL (ref 6.1–8)
PROT UR STRIP.AUTO-MCNC: NEGATIVE MG/DL
RBC # BLD AUTO: 4.67 MILL/MM3 (ref 4.7–6.1)
RBC #/AREA URNS HPF: NORMAL /HPF
RENAL EPI CELLS #/AREA URNS HPF: NORMAL /[HPF]
SEGMENTED NEUTROPHILS ABSOLUTE COUNT: 2.3 THOU/MM3 (ref 1.8–7.7)
SODIUM SERPL-SCNC: 139 MEQ/L (ref 135–145)
SPECIFIC GRAVITY UA: 1.02 (ref 1–1.03)
TRIGL SERPL-MCNC: 75 MG/DL (ref 0–199)
TSH SERPL DL<=0.005 MIU/L-ACNC: 2.42 UIU/ML (ref 0.4–4.2)
UROBILINOGEN, URINE: 1 EU/DL (ref 0–1)
WBC # BLD AUTO: 4.8 THOU/MM3 (ref 4.8–10.8)
WBC #/AREA URNS HPF: NORMAL /HPF
YEAST LIKE FUNGI URNS QL MICRO: NORMAL

## 2023-04-04 DIAGNOSIS — K21.9 GASTROESOPHAGEAL REFLUX DISEASE WITHOUT ESOPHAGITIS: ICD-10-CM

## 2023-04-05 RX ORDER — OMEPRAZOLE 40 MG/1
CAPSULE, DELAYED RELEASE ORAL
Qty: 90 CAPSULE | Refills: 0 | OUTPATIENT
Start: 2023-04-05

## 2023-11-28 DIAGNOSIS — Z98.84 S/P LAPAROSCOPIC SLEEVE GASTRECTOMY: Primary | ICD-10-CM

## 2023-11-28 DIAGNOSIS — K91.2 POSTOPERATIVE MALABSORPTION: ICD-10-CM

## 2023-11-28 DIAGNOSIS — Z13.21 MALNUTRITION SCREEN: ICD-10-CM
